# Patient Record
Sex: FEMALE | Race: WHITE
[De-identification: names, ages, dates, MRNs, and addresses within clinical notes are randomized per-mention and may not be internally consistent; named-entity substitution may affect disease eponyms.]

---

## 2018-02-09 ENCOUNTER — HOSPITAL ENCOUNTER (INPATIENT)
Dept: HOSPITAL 17 - NEPE | Age: 83
LOS: 7 days | Discharge: HOSPICE | DRG: 871 | End: 2018-02-16
Attending: HOSPITALIST | Admitting: HOSPITALIST
Payer: MEDICARE

## 2018-02-09 VITALS
OXYGEN SATURATION: 94 % | HEART RATE: 93 BPM | RESPIRATION RATE: 20 BRPM | SYSTOLIC BLOOD PRESSURE: 135 MMHG | DIASTOLIC BLOOD PRESSURE: 62 MMHG

## 2018-02-09 VITALS
SYSTOLIC BLOOD PRESSURE: 135 MMHG | OXYGEN SATURATION: 93 % | RESPIRATION RATE: 20 BRPM | DIASTOLIC BLOOD PRESSURE: 62 MMHG | HEART RATE: 92 BPM

## 2018-02-09 VITALS — BODY MASS INDEX: 24.06 KG/M2 | HEIGHT: 62 IN | WEIGHT: 130.73 LBS

## 2018-02-09 VITALS
SYSTOLIC BLOOD PRESSURE: 120 MMHG | HEART RATE: 79 BPM | DIASTOLIC BLOOD PRESSURE: 59 MMHG | OXYGEN SATURATION: 92 % | RESPIRATION RATE: 18 BRPM

## 2018-02-09 DIAGNOSIS — Z79.4: ICD-10-CM

## 2018-02-09 DIAGNOSIS — J96.01: ICD-10-CM

## 2018-02-09 DIAGNOSIS — J44.0: ICD-10-CM

## 2018-02-09 DIAGNOSIS — J96.02: ICD-10-CM

## 2018-02-09 DIAGNOSIS — I21.4: ICD-10-CM

## 2018-02-09 DIAGNOSIS — Z66: ICD-10-CM

## 2018-02-09 DIAGNOSIS — I10: ICD-10-CM

## 2018-02-09 DIAGNOSIS — F17.210: ICD-10-CM

## 2018-02-09 DIAGNOSIS — E87.70: ICD-10-CM

## 2018-02-09 DIAGNOSIS — F03.90: ICD-10-CM

## 2018-02-09 DIAGNOSIS — A41.9: Primary | ICD-10-CM

## 2018-02-09 DIAGNOSIS — Z90.710: ICD-10-CM

## 2018-02-09 DIAGNOSIS — E11.9: ICD-10-CM

## 2018-02-09 DIAGNOSIS — E87.2: ICD-10-CM

## 2018-02-09 DIAGNOSIS — G93.40: ICD-10-CM

## 2018-02-09 DIAGNOSIS — J18.1: ICD-10-CM

## 2018-02-09 LAB
ALBUMIN SERPL-MCNC: 2.2 GM/DL (ref 3.4–5)
ALP SERPL-CCNC: 111 U/L (ref 45–117)
ALT SERPL-CCNC: 11 U/L (ref 10–53)
AST SERPL-CCNC: 9 U/L (ref 15–37)
BASOPHILS # BLD AUTO: 0 TH/MM3 (ref 0–0.2)
BASOPHILS NFR BLD: 0.2 % (ref 0–2)
BILIRUB SERPL-MCNC: 0.7 MG/DL (ref 0.2–1)
BUN SERPL-MCNC: 12 MG/DL (ref 7–18)
CALCIUM SERPL-MCNC: 8.3 MG/DL (ref 8.5–10.1)
CHLORIDE SERPL-SCNC: 91 MEQ/L (ref 98–107)
CREAT SERPL-MCNC: 0.81 MG/DL (ref 0.5–1)
EOSINOPHIL # BLD: 0 TH/MM3 (ref 0–0.4)
EOSINOPHIL NFR BLD: 0 % (ref 0–4)
ERYTHROCYTE [DISTWIDTH] IN BLOOD BY AUTOMATED COUNT: 14.2 % (ref 11.6–17.2)
GFR SERPLBLD BASED ON 1.73 SQ M-ARVRAT: 68 ML/MIN (ref 89–?)
GLUCOSE SERPL-MCNC: 116 MG/DL (ref 74–106)
HCO3 BLD-SCNC: 27.2 MEQ/L (ref 21–32)
HCT VFR BLD CALC: 38.3 % (ref 35–46)
HGB BLD-MCNC: 13 GM/DL (ref 11.6–15.3)
INR PPP: 1.2 RATIO
LYMPHOCYTES # BLD AUTO: 2.2 TH/MM3 (ref 1–4.8)
LYMPHOCYTES NFR BLD AUTO: 9.2 % (ref 9–44)
MCH RBC QN AUTO: 31.2 PG (ref 27–34)
MCHC RBC AUTO-ENTMCNC: 33.9 % (ref 32–36)
MCV RBC AUTO: 92.1 FL (ref 80–100)
MONOCYTE #: 1.6 TH/MM3 (ref 0–0.9)
MONOCYTES NFR BLD: 6.9 % (ref 0–8)
NEUTROPHILS # BLD AUTO: 19.8 TH/MM3 (ref 1.8–7.7)
NEUTROPHILS NFR BLD AUTO: 83.7 % (ref 16–70)
PLATELET # BLD: 528 TH/MM3 (ref 150–450)
PMV BLD AUTO: 7.7 FL (ref 7–11)
PROT SERPL-MCNC: 6.8 GM/DL (ref 6.4–8.2)
PROTHROMBIN TIME: 11.8 SEC (ref 9.8–11.6)
RBC # BLD AUTO: 4.16 MIL/MM3 (ref 4–5.3)
SODIUM SERPL-SCNC: 129 MEQ/L (ref 136–145)
TROPONIN I SERPL-MCNC: 0.25 NG/ML (ref 0.02–0.05)
WBC # BLD AUTO: 23.6 TH/MM3 (ref 4–11)

## 2018-02-09 PROCEDURE — 94003 VENT MGMT INPAT SUBQ DAY: CPT

## 2018-02-09 PROCEDURE — 36600 WITHDRAWAL OF ARTERIAL BLOOD: CPT

## 2018-02-09 PROCEDURE — 80202 ASSAY OF VANCOMYCIN: CPT

## 2018-02-09 PROCEDURE — 83690 ASSAY OF LIPASE: CPT

## 2018-02-09 PROCEDURE — 82948 REAGENT STRIP/BLOOD GLUCOSE: CPT

## 2018-02-09 PROCEDURE — 80048 BASIC METABOLIC PNL TOTAL CA: CPT

## 2018-02-09 PROCEDURE — 82565 ASSAY OF CREATININE: CPT

## 2018-02-09 PROCEDURE — 82805 BLOOD GASES W/O2 SATURATION: CPT

## 2018-02-09 PROCEDURE — 85610 PROTHROMBIN TIME: CPT

## 2018-02-09 PROCEDURE — 84484 ASSAY OF TROPONIN QUANT: CPT

## 2018-02-09 PROCEDURE — 93005 ELECTROCARDIOGRAM TRACING: CPT

## 2018-02-09 PROCEDURE — 87804 INFLUENZA ASSAY W/OPTIC: CPT

## 2018-02-09 PROCEDURE — 71045 X-RAY EXAM CHEST 1 VIEW: CPT

## 2018-02-09 PROCEDURE — 96368 THER/DIAG CONCURRENT INF: CPT

## 2018-02-09 PROCEDURE — 85025 COMPLETE CBC W/AUTO DIFF WBC: CPT

## 2018-02-09 PROCEDURE — 85730 THROMBOPLASTIN TIME PARTIAL: CPT

## 2018-02-09 PROCEDURE — 94640 AIRWAY INHALATION TREATMENT: CPT

## 2018-02-09 PROCEDURE — 94664 DEMO&/EVAL PT USE INHALER: CPT

## 2018-02-09 PROCEDURE — 82272 OCCULT BLD FECES 1-3 TESTS: CPT

## 2018-02-09 PROCEDURE — 87070 CULTURE OTHR SPECIMN AEROBIC: CPT

## 2018-02-09 PROCEDURE — 93306 TTE W/DOPPLER COMPLETE: CPT

## 2018-02-09 PROCEDURE — 83605 ASSAY OF LACTIC ACID: CPT

## 2018-02-09 PROCEDURE — 71046 X-RAY EXAM CHEST 2 VIEWS: CPT

## 2018-02-09 PROCEDURE — 94002 VENT MGMT INPAT INIT DAY: CPT

## 2018-02-09 PROCEDURE — 82550 ASSAY OF CK (CPK): CPT

## 2018-02-09 PROCEDURE — 87205 SMEAR GRAM STAIN: CPT

## 2018-02-09 PROCEDURE — 87040 BLOOD CULTURE FOR BACTERIA: CPT

## 2018-02-09 PROCEDURE — 80053 COMPREHEN METABOLIC PANEL: CPT

## 2018-02-09 PROCEDURE — 96365 THER/PROPH/DIAG IV INF INIT: CPT

## 2018-02-09 PROCEDURE — 85027 COMPLETE CBC AUTOMATED: CPT

## 2018-02-09 RX ADMIN — PHENYTOIN SODIUM SCH MLS/HR: 50 INJECTION INTRAMUSCULAR; INTRAVENOUS at 01:00

## 2018-02-09 RX ADMIN — HEPARIN SODIUM PRN MLS/HR: 10000 INJECTION, SOLUTION INTRAVENOUS at 22:23

## 2018-02-09 NOTE — PD
HPI


Chief Complaint:  Fever


Time Seen by Provider:  19:19


Travel History


International Travel<30 days:  No


Contact w/Intl Traveler<30days:  No


Traveled to known affect area:  No





History of Present Illness


HPI


Patient is an 83-year-old female coming from a nursing home WITH  PMH of HTN, 

DM and Dementia .. .   


 she's been having weakness tiredness fever and cough for the last few days.  

Patient is a good historian reports feeling not well for the last 5 days.  She 

says she has a sore throat feels her throat is very dry she's had a mild cough.





PFSH


Past Medical History


Cardiovascular Problems:  Yes


Diabetes:  Yes


Respiratory:  Yes





Social History


Tobacco Use:  No


Substance Use:  No





Allergies-Medications


(Allergen,Severity, Reaction):  


Coded Allergies:  


     No Known Allergies (Verified  Allergy, Unknown, 2/9/18)


Reported Meds & Prescriptions





Reported Meds & Active Scripts


Active


Reported


Tramadol (Tramadol HCl) 50 Mg Tab 50 Mg PO Q4H PRN


Cough Syrup (Guaifenesin) 100 Mg/5 Ml Liquid   


Nitroglycerin SL (Nitroglycerin) 0.4 Mg Subl 0.4 Mg SL AS DIRECTED PRN


     ONE TABLET UNDER THE TONGUE AS NEEDED FOR CHEST PAIN, MAY REPEAT EVERY


     FIVE MINUTES FOR A TOTAL OF 3 DOSES OR CALL 911 IF NO RELIEF


Tylenol (Acetaminophen) 325 Mg Tab 325 Mg PO Q4H PRN


Gabapentin 300 Mg Cap 300 Mg PO BID


Lantus Inj (Insulin Glargine) 1,000 Unit/10 Ml Vial 26 Units SQ HS


Lovastatin 10 Mg Tab 10 Mg PO DAILY


Plavix (Clopidogrel Bisulfate) 75 Mg Tab 75 Mg PO DAILY


Sertraline (Sertraline HCl) 50 Mg Tab 50 Mg PO DAILY


Furosemide 20 Mg Tab 20 Mg PO DAILY


Potassium Chloride ER (Potassium Chloride) 10 Meq Tab 10 Meq PO DAILY


Omeprazole 20 Mg Tab 20 Mg PO DAILY








Review of Systems


Except as stated in HPI:  all other systems reviewed are Neg


General / Constitutional:  Positive: Fever


Musculoskeletal:  Positive: Weakness (GENERALIZED WEAKNESS FEVER LISTLESS)





Physical Exam


Narrative


GENERAL: AWAKE ALERT APPEARS TO BE A GOOD HISTORIAN


SKIN: Warm and dry.


HEAD: Atraumatic. Normocephalic. 


EYES: Pupils equal and round. No scleral icterus. No injection or drainage. 


ENT: No nasal bleeding or discharge.  Mucous membranes pink and moist.


NECK: Trachea midline. No JVD. 


CARDIOVASCULAR: Regular rate and rhythm.  


RESPIRATORY: No accessory muscle use. DECREASED BREATH SOUND IN  RIGHT LOWER 

LUNG AND BASIALR CRACKLES  


GASTROINTESTINAL: Abdomen soft, non-tender, nondistended. Hepatic and splenic 

margins not palpable. 


MUSCULOSKELETAL: Extremities without clubbing, cyanosis, or edema. No obvious 

deformities. 


NEUROLOGICAL: Awake and alert. No obvious cranial nerve deficits.  Motor 

grossly within normal limits. Five out of 5 muscle strength in the arms and 

legs.  Normal speech.


PSYCHIATRIC: Appropriate mood and affect; insight and judgment normal.





Data


Data


Last Documented VS





Vital Signs








  Date Time  Temp Pulse Resp B/P (MAP) Pulse Ox O2 Delivery O2 Flow Rate FiO2


 


2/9/18 20:12     93 Nasal Cannula 3.00 


 


2/9/18 20:12  92 20 135/62 (86)    








Orders





 Orders


Complete Blood Count With Diff (2/9/18 19:19)


Comprehensive Metabolic Panel (2/9/18 19:19)


Ckmb (Isoenzyme) Profile (2/9/18 19:19)


Troponin I (2/9/18 19:19)


Lipase (2/9/18 19:19)


Chest, Pa & Lat (2/9/18 19:19)


Vancomycin Inj (Vancomycin Inj) (2/9/18 20:15)


Piperacil-Tazo 3.375 Gm Premix (Zosyn 3. (2/9/18 20:15)


Albuterol-Ipratropium Neb (Duoneb Neb) (2/9/18 20:15)


Sodium Chlor 0.9% 1000 Ml Inj (Ns 1000 M (2/9/18 20:15)


Influenzae A/B Antigen (2/9/18 20:40)


Aspirin Chew (Aspirin Chew) (2/9/18 21:45)


Nitroglycerin 2% Oint (Nitroglycerin 2% (2/9/18 21:45)


Heparin Inj (Heparin Inj) (2/9/18 21:45)


Heparin Inj (Heparin Inj) (2/10/18 03:45)


Heparin Inj (Heparin Inj) (2/10/18 03:45)


Heparin-D5w 25,000 U/250 Ml (Heparin-D5w (2/9/18 21:45)


Act Partial Throm Time (Ptt) (2/9/18 21:39)


Prothrombin Time / Inr (Pt) (2/9/18 21:39)


Act Partial Throm Time (Ptt) (2/10/18 04:39)


Occult Blood (Hemoccult) Stool (2/9/18 21:39)


Admit Order (Ed Use Only) (2/9/18 )





Labs





Laboratory Tests








Test


  2/9/18


19:30 2/9/18


21:30


 


White Blood Count 23.6 TH/MM3  


 


Red Blood Count 4.16 MIL/MM3  


 


Hemoglobin 13.0 GM/DL  


 


Hematocrit 38.3 %  


 


Mean Corpuscular Volume 92.1 FL  


 


Mean Corpuscular Hemoglobin 31.2 PG  


 


Mean Corpuscular Hemoglobin


Concent 33.9 % 


  


 


 


Red Cell Distribution Width 14.2 %  


 


Platelet Count 528 TH/MM3  


 


Mean Platelet Volume 7.7 FL  


 


Neutrophils (%) (Auto) 83.7 %  


 


Lymphocytes (%) (Auto) 9.2 %  


 


Monocytes (%) (Auto) 6.9 %  


 


Eosinophils (%) (Auto) 0.0 %  


 


Basophils (%) (Auto) 0.2 %  


 


Neutrophils # (Auto) 19.8 TH/MM3  


 


Lymphocytes # (Auto) 2.2 TH/MM3  


 


Monocytes # (Auto) 1.6 TH/MM3  


 


Eosinophils # (Auto) 0.0 TH/MM3  


 


Basophils # (Auto) 0.0 TH/MM3  


 


CBC Comment DIFF FINAL  


 


Differential Comment   


 


Blood Urea Nitrogen 12 MG/DL  


 


Creatinine 0.81 MG/DL  


 


Random Glucose 116 MG/DL  


 


Total Protein 6.8 GM/DL  


 


Albumin 2.2 GM/DL  


 


Calcium Level 8.3 MG/DL  


 


Alkaline Phosphatase 111 U/L  


 


Aspartate Amino Transf


(AST/SGOT) 9 U/L 


  


 


 


Alanine Aminotransferase


(ALT/SGPT) 11 U/L 


  


 


 


Total Bilirubin 0.7 MG/DL  


 


Sodium Level 129 MEQ/L  


 


Potassium Level 3.5 MEQ/L  


 


Chloride Level 91 MEQ/L  


 


Carbon Dioxide Level 27.2 MEQ/L  


 


Anion Gap 11 MEQ/L  


 


Estimat Glomerular Filtration


Rate 68 ML/MIN 


  


 


 


Total Creatine Kinase 40 U/L  


 


Troponin I 0.25 NG/ML  


 


Lipase 64 U/L  


 


Prothrombin Time  11.8 SEC 


 


Prothromb Time International


Ratio 


  1.2 RATIO 


 


 


Activated Partial


Thromboplast Time 


  33.2 SEC 


 











MDM


Medical Decision Making


Medical Screen Exam Complete:  Yes


Emergency Medical Condition:  Yes


Differential Diagnosis


FEVER  FUO, SEPSIS OF  VIRAL ILLNESS VS  SEPSIS FROM  PNA  OR  UTI ,  OTHER


Narrative Course


FLUID RESUSITATION INITIATED  TYLENOL AND  CXRAY   LARGE  RIGHT  RML AND RLL 

PNA   VANCO  ZOSYN,   THEN  TROP RETURNS  ELEVATED AND  SHE IS GIVEN   ASA AND  

NITRO AND  ADMITTED TO  MEDICAL SERVICE TELE  , FOR SEPSIS  MI NON STEMI AND PNA





Diagnosis





 Primary Impression:  


 NSTEMI (non-ST elevated myocardial infarction)


 Additional Impression:  


 PNA (pneumonia)


 Qualified Codes:  J18.1 - Lobar pneumonia, unspecified organism











Lennox Steele MD Feb 9, 2018 19:52

## 2018-02-09 NOTE — HHI.HP
__________________________________________________





HPI


Service


[facility]


Primary Care Physician


Poli Baer MD (Paul)


Admission Diagnosis





CP   NSTEMI


Diagnoses:  


(1) Sepsis


Diagnosis:  Principal





(2) PNA (pneumonia)


Diagnosis:  Principal





(3) NSTEMI (non-ST elevated myocardial infarction)


Diagnosis:  Principal





(4) DM (diabetes mellitus)


Diagnosis:  Principal





Travel History


International Travel<30 Days:  No


Contact w/Intl Traveler <30 Da:  No


Traveled to Known Affected Are:  No


History of Present Illness


This is an 83-year-old female with a PMH of HTN, DM and Dementia who was sent 

to the ER from SNF secondary to generalized weakness, fever and confusion.  Per 

patient, she's had progressive weakness/fatigue for 4-5 days w/ associated non-

productive cough.  Unsure if she's had fever, however SNF records indicate 

fever of 102.0.  No c/o chest pain or SOB.  On arrival, /62, HR 93, O2 

sat 94% on RA.  WBC 23.6.  Na 129.  468.  Troponin 0.25.  INR 1.2.  CXR patchy 

diffuse infiltrate of right mid to right lower lung suggestive of pneumonia.  S/

p Vanc/Zosyn and started on Heparin gtt in ER.





Review of Systems


Except as stated in HPI:  all other systems reviewed are Neg


ROS: 14 point review of systems otherwise negative.





Past Family Social History


Past Medical History


PMH:  HTN, DM and Dementia


Past Surgical History


PAST SURGICAL HISTORY:  Hysterectomy


Allergies:  


Coded Allergies:  


     No Known Allergies (Verified  Allergy, Unknown, 18)


Family History


PAST FAMILY HISTORY:  Reviewed.  No h/o DM or CAD


Social History


PAST SOCIAL HISTORY:  Negative for alcohol, tobacco or drugs.





Physical Exam


Vital Signs





Vital Signs








  Date Time  Temp Pulse Resp B/P (MAP) Pulse Ox O2 Delivery O2 Flow Rate FiO2


 


18 20:12     93 Nasal Cannula 3.00 


 


18 20:12  92 20 135/62 (86) 93 Nasal Cannula  


 


18 19:12  93 20 135/62 (86) 94   








Physical Exam


PE:


GENERAL: Elderly white female in no acute distress.


HEENT: PERRLA, EOMI. No scleral icterus or conjunctival pallor. No lid lag or 

facial droop.  


CARDIOVASCULAR: Regular rate and rhythm.  No obvious murmurs to auscultation. 

No chest tenderness to palpation. 


RESPIRATORY: No obvious rhonchi or wheezing. Clear to auscultation. Breath 

sounds equal bilaterally. 


GASTROINTESTINAL: Abdomen soft, non-tender, nondistended. BS normal. 


MUSCULOSKELETAL: Extremities without clubbing, cyanosis, or edema. No obvious 

deformities. 


NEUROLOGICAL: Awake, alert and oriented x4. No focal neurologic deficits. 

Moving both upper and lower extremities spontaneously.


Laboratory





Laboratory Tests








Test


  18


19:30 18


21:30


 


White Blood Count 23.6  


 


Red Blood Count 4.16  


 


Hemoglobin 13.0  


 


Hematocrit 38.3  


 


Mean Corpuscular Volume 92.1  


 


Mean Corpuscular Hemoglobin 31.2  


 


Mean Corpuscular Hemoglobin


Concent 33.9 


  


 


 


Red Cell Distribution Width 14.2  


 


Platelet Count 528  


 


Mean Platelet Volume 7.7  


 


Neutrophils (%) (Auto) 83.7  


 


Lymphocytes (%) (Auto) 9.2  


 


Monocytes (%) (Auto) 6.9  


 


Eosinophils (%) (Auto) 0.0  


 


Basophils (%) (Auto) 0.2  


 


Neutrophils # (Auto) 19.8  


 


Lymphocytes # (Auto) 2.2  


 


Monocytes # (Auto) 1.6  


 


Eosinophils # (Auto) 0.0  


 


Basophils # (Auto) 0.0  


 


CBC Comment DIFF FINAL  


 


Differential Comment   


 


Blood Urea Nitrogen 12  


 


Creatinine 0.81  


 


Random Glucose 116  


 


Total Protein 6.8  


 


Albumin 2.2  


 


Calcium Level 8.3  


 


Alkaline Phosphatase 111  


 


Aspartate Amino Transf


(AST/SGOT) 9 


  


 


 


Alanine Aminotransferase


(ALT/SGPT) 11 


  


 


 


Total Bilirubin 0.7  


 


Sodium Level 129  


 


Potassium Level 3.5  


 


Chloride Level 91  


 


Carbon Dioxide Level 27.2  


 


Anion Gap 11  


 


Estimat Glomerular Filtration


Rate 68 


  


 


 


Total Creatine Kinase 40  


 


Troponin I 0.25  


 


Lipase 64  














 Date/Time


Source Procedure


Growth Status


 


 


 18 21:25


Nasal Aspirate Influenza Types A,B Antigen (ZEINAB) - Final


NEGATIVE FOR FLU A AND B ANTIGEN.... Complete








Result Diagram:  


18








Caprini VTE Risk Assessment


Caprini VTE Risk Assessment:  Mod/High Risk (score >= 2)


Caprini Risk Assessment Model











 Point Value = 1          Point Value = 2  Point Value = 3  Point Value = 5


 


Age 41-60


Minor surgery


BMI > 25 kg/m2


Swollen legs


Varicose veins


Pregnancy or postpartum


History of unexplained or recurrent


   spontaneous 


Oral contraceptives or hormone


   replacement


Sepsis (< 1 month)


Serious lung disease, including


   pneumonia (< 1 month)


Abnormal pulmonary function


Acute myocardial infarction


Congestive heart failure (< 1 month)


History of inflammatory bowel disease


Medical patient at bed rest Age 61-74


Arthroscopic surgery


Major open surgery (> 45 min)


Laparoscopic surgery (> 45 min)


Malignancy


Confined to bed (> 72 hours)


Immobilizing plaster cast


Central venous access Age >= 75


History of VTE


Family history of VTE


Factor V Leiden


Prothrombin 16969E


Lupus anticoagulant


Anticardiolipin antibodies


Elevated serum homocysteine


Heparin-induced thrombocytopenia


Other congenital or acquired


   thrombophilia Stroke (< 1 month)


Elective arthroplasty


Hip, pelvis, or leg fracture


Acute spinal cord injury (< 1 month)








Prophylaxis Regimen











   Total Risk


Factor Score Risk Level Prophylaxis Regimen


 


0-1      Low Early ambulation


 


2 Moderate Order ONE of the following:


*Sequential Compression Device (SCD)


*Heparin 5000 units SQ BID


 


3-4 Higher Order ONE of the following medications:


*Heparin 5000 units SQ TID


*Enoxaparin/Lovenox 40 mg SQ daily (WT < 150 kg, CrCl > 30 mL/min)


*Enoxaparin/Lovenox 30 mg SQ daily (WT < 150 kg, CrCl > 10-29 mL/min)


*Enoxaparin/Lovenox 30 mg SQ BID (WT < 150 kg, CrCl > 30 mL/min)


AND/OR


*Sequential Compression Device (SCD)


 


5 or more Highest Order ONE of the following medications:


*Heparin 5000 units SQ TID (Preferred with Epidurals)


*Enoxaparin/Lovenox 40 mg SQ daily (WT < 150 kg, CrCl > 30 mL/min)


*Enoxaparin/Lovenox 30 mg SQ daily (WT < 150 kg, CrCl > 10-29 mL/min)


*Enoxaparin/Lovenox 30 mg SQ BID (WT < 150 kg, CrCl > 30 mL/min)


AND


*Sequential Compression Device (SCD)











Assessment and Plan


Problem List:  


(1) Sepsis


ICD Code:  A41.9 - Sepsis, unspecified organism


(2) PNA (pneumonia)


ICD Code:  J18.9 - Pneumonia, unspecified organism


(3) NSTEMI (non-ST elevated myocardial infarction)


ICD Code:  I21.4 - Non-ST elevation (NSTEMI) myocardial infarction


(4) DM (diabetes mellitus)


ICD Code:  E11.9 - Type 2 diabetes mellitus without complications


Assessment and Plan


A/P:


1.  Sepsis:  HR 90, WBC 26, Source-PNA.  Check Blood Cultures, check Lactic 

Acid.  S/p Vanc/Zosyn in ER, continue w/ IV Abx.  IVF for hydration. 


2.  PNA:  c/o cough/fever.  WBC 26 as above, CXR w/ RLL/RML PNA.  Check Sputum 

Cultures, continue w/ IV Abx as above.  DuoNeb prn if needed. 


3.  NSTEMI:  Trop 0.25, no c/o chest pain at this time.  Started on Heparin gtt

, continue Heparin, check serial cardiac enzymes for trend.  Consult Cardiology 

for further evaluation/recommendations. 


4.  DM:  Sliding scale w/ Accu-Cheks.  Resume home Insulin.


5.  DVT Prophylaxis:  Heparin gtt


6.  Social work for d/c planning as needed. 


7.  Case discussed w/ ER physician at length, labs/records/imaging reviewed by 

me.





Physician Certification


2 Midnight Certification Type:  Admission for Inpatient Services


Order for Inpatient Services


The services are ordered in accordance with Medicare regulations or non-

Medicare payer requirements, as applicable.  In the case of services not 

specified as inpatient-only, they are appropriately provided as inpatient 

services in accordance with the 2-midnight benchmark.


Estimated LOS (days):  2


 days is the estimated time the patient will need to remain in the hospital, 

assuming treatment plan goals are met and no additional complications.


Post-Hospital Plan:  Not yet determined











Brianna Tong MD 2018 22:01

## 2018-02-10 VITALS
DIASTOLIC BLOOD PRESSURE: 54 MMHG | OXYGEN SATURATION: 97 % | RESPIRATION RATE: 22 BRPM | HEART RATE: 80 BPM | SYSTOLIC BLOOD PRESSURE: 123 MMHG | TEMPERATURE: 98.2 F

## 2018-02-10 VITALS
OXYGEN SATURATION: 96 % | SYSTOLIC BLOOD PRESSURE: 127 MMHG | HEART RATE: 80 BPM | TEMPERATURE: 98.3 F | RESPIRATION RATE: 20 BRPM | DIASTOLIC BLOOD PRESSURE: 52 MMHG

## 2018-02-10 VITALS — HEART RATE: 76 BPM

## 2018-02-10 VITALS
TEMPERATURE: 98.6 F | OXYGEN SATURATION: 92 % | DIASTOLIC BLOOD PRESSURE: 62 MMHG | SYSTOLIC BLOOD PRESSURE: 151 MMHG | RESPIRATION RATE: 24 BRPM | HEART RATE: 113 BPM

## 2018-02-10 VITALS — OXYGEN SATURATION: 2 %

## 2018-02-10 VITALS — HEART RATE: 118 BPM

## 2018-02-10 VITALS
HEART RATE: 80 BPM | SYSTOLIC BLOOD PRESSURE: 113 MMHG | DIASTOLIC BLOOD PRESSURE: 47 MMHG | TEMPERATURE: 98.1 F | RESPIRATION RATE: 18 BRPM | OXYGEN SATURATION: 99 %

## 2018-02-10 VITALS
DIASTOLIC BLOOD PRESSURE: 55 MMHG | SYSTOLIC BLOOD PRESSURE: 127 MMHG | TEMPERATURE: 99.1 F | OXYGEN SATURATION: 95 % | RESPIRATION RATE: 20 BRPM | HEART RATE: 84 BPM

## 2018-02-10 VITALS — HEART RATE: 84 BPM

## 2018-02-10 VITALS
OXYGEN SATURATION: 92 % | HEART RATE: 84 BPM | SYSTOLIC BLOOD PRESSURE: 122 MMHG | TEMPERATURE: 98.7 F | RESPIRATION RATE: 18 BRPM | DIASTOLIC BLOOD PRESSURE: 56 MMHG

## 2018-02-10 VITALS — HEART RATE: 81 BPM

## 2018-02-10 VITALS — HEART RATE: 77 BPM

## 2018-02-10 VITALS — HEART RATE: 80 BPM

## 2018-02-10 VITALS — HEART RATE: 85 BPM

## 2018-02-10 VITALS — HEART RATE: 83 BPM

## 2018-02-10 VITALS — HEART RATE: 96 BPM

## 2018-02-10 VITALS — HEART RATE: 99 BPM

## 2018-02-10 VITALS — HEART RATE: 75 BPM

## 2018-02-10 LAB
ALBUMIN SERPL-MCNC: 1.9 GM/DL (ref 3.4–5)
ALP SERPL-CCNC: 91 U/L (ref 45–117)
ALT SERPL-CCNC: 9 U/L (ref 10–53)
AST SERPL-CCNC: 10 U/L (ref 15–37)
BASOPHILS # BLD AUTO: 0 TH/MM3 (ref 0–0.2)
BASOPHILS NFR BLD: 0.1 % (ref 0–2)
BILIRUB SERPL-MCNC: 0.8 MG/DL (ref 0.2–1)
BUN SERPL-MCNC: 12 MG/DL (ref 7–18)
CALCIUM SERPL-MCNC: 7.9 MG/DL (ref 8.5–10.1)
CHLORIDE SERPL-SCNC: 97 MEQ/L (ref 98–107)
CREAT SERPL-MCNC: 0.7 MG/DL (ref 0.5–1)
EOSINOPHIL # BLD: 0 TH/MM3 (ref 0–0.4)
EOSINOPHIL NFR BLD: 0.1 % (ref 0–4)
ERYTHROCYTE [DISTWIDTH] IN BLOOD BY AUTOMATED COUNT: 13.9 % (ref 11.6–17.2)
GFR SERPLBLD BASED ON 1.73 SQ M-ARVRAT: 80 ML/MIN (ref 89–?)
GLUCOSE SERPL-MCNC: 107 MG/DL (ref 74–106)
HCO3 BLD-SCNC: 26.9 MEQ/L (ref 21–32)
HCT VFR BLD CALC: 35.6 % (ref 35–46)
HGB BLD-MCNC: 12.1 GM/DL (ref 11.6–15.3)
LYMPHOCYTES # BLD AUTO: 2.1 TH/MM3 (ref 1–4.8)
LYMPHOCYTES NFR BLD AUTO: 9.8 % (ref 9–44)
MCH RBC QN AUTO: 31.4 PG (ref 27–34)
MCHC RBC AUTO-ENTMCNC: 34 % (ref 32–36)
MCV RBC AUTO: 92.4 FL (ref 80–100)
MONOCYTE #: 1.8 TH/MM3 (ref 0–0.9)
MONOCYTES NFR BLD: 8.6 % (ref 0–8)
NEUTROPHILS # BLD AUTO: 17.1 TH/MM3 (ref 1.8–7.7)
NEUTROPHILS NFR BLD AUTO: 81.4 % (ref 16–70)
PLATELET # BLD: 459 TH/MM3 (ref 150–450)
PMV BLD AUTO: 7.1 FL (ref 7–11)
PROT SERPL-MCNC: 6.4 GM/DL (ref 6.4–8.2)
RBC # BLD AUTO: 3.86 MIL/MM3 (ref 4–5.3)
SODIUM SERPL-SCNC: 132 MEQ/L (ref 136–145)
TROPONIN I SERPL-MCNC: 0.19 NG/ML (ref 0.02–0.05)
WBC # BLD AUTO: 21.1 TH/MM3 (ref 4–11)

## 2018-02-10 RX ADMIN — CEFEPIME SCH MLS/HR: 1 INJECTION, POWDER, FOR SOLUTION INTRAMUSCULAR; INTRAVENOUS at 09:24

## 2018-02-10 RX ADMIN — ONDANSETRON PRN MG: 2 INJECTION, SOLUTION INTRAMUSCULAR; INTRAVENOUS at 10:35

## 2018-02-10 RX ADMIN — CEFEPIME SCH MLS/HR: 1 INJECTION, POWDER, FOR SOLUTION INTRAMUSCULAR; INTRAVENOUS at 19:52

## 2018-02-10 RX ADMIN — INSULIN ASPART SCH: 100 INJECTION, SOLUTION INTRAVENOUS; SUBCUTANEOUS at 08:31

## 2018-02-10 RX ADMIN — SERTRALINE HYDROCHLORIDE SCH MG: 50 TABLET, FILM COATED ORAL at 09:22

## 2018-02-10 RX ADMIN — GUAIFENESIN PRN MG: 100 SOLUTION ORAL at 11:15

## 2018-02-10 RX ADMIN — Medication SCH ML: at 19:52

## 2018-02-10 RX ADMIN — VANCOMYCIN HYDROCHLORIDE SCH MLS/HR: 1 INJECTION, SOLUTION INTRAVENOUS at 19:51

## 2018-02-10 RX ADMIN — GABAPENTIN SCH MG: 300 CAPSULE ORAL at 09:22

## 2018-02-10 RX ADMIN — ALBUTEROL SULFATE PRN MG: 2.5 SOLUTION RESPIRATORY (INHALATION) at 11:32

## 2018-02-10 RX ADMIN — ACYCLOVIR SCH UNITS: 800 TABLET ORAL at 19:53

## 2018-02-10 RX ADMIN — GABAPENTIN SCH MG: 300 CAPSULE ORAL at 19:53

## 2018-02-10 RX ADMIN — PHENYTOIN SODIUM SCH MLS/HR: 50 INJECTION INTRAMUSCULAR; INTRAVENOUS at 17:12

## 2018-02-10 RX ADMIN — GUAIFENESIN PRN MG: 100 SOLUTION ORAL at 19:52

## 2018-02-10 RX ADMIN — GUAIFENESIN SCH MG: 600 TABLET, EXTENDED RELEASE ORAL at 19:53

## 2018-02-10 RX ADMIN — STANDARDIZED SENNA CONCENTRATE AND DOCUSATE SODIUM SCH TAB: 8.6; 5 TABLET, FILM COATED ORAL at 19:53

## 2018-02-10 RX ADMIN — INSULIN ASPART SCH: 100 INJECTION, SOLUTION INTRAVENOUS; SUBCUTANEOUS at 21:00

## 2018-02-10 RX ADMIN — INSULIN ASPART SCH: 100 INJECTION, SOLUTION INTRAVENOUS; SUBCUTANEOUS at 17:11

## 2018-02-10 RX ADMIN — STANDARDIZED SENNA CONCENTRATE AND DOCUSATE SODIUM SCH TAB: 8.6; 5 TABLET, FILM COATED ORAL at 09:22

## 2018-02-10 RX ADMIN — Medication SCH ML: at 09:24

## 2018-02-10 RX ADMIN — PHENYTOIN SODIUM SCH MLS/HR: 50 INJECTION INTRAMUSCULAR; INTRAVENOUS at 07:57

## 2018-02-10 RX ADMIN — INSULIN ASPART SCH: 100 INJECTION, SOLUTION INTRAVENOUS; SUBCUTANEOUS at 11:18

## 2018-02-10 RX ADMIN — PANTOPRAZOLE SODIUM SCH MG: 20 TABLET, DELAYED RELEASE ORAL at 09:22

## 2018-02-10 NOTE — MB
cc:

GIOVANI SILVA M.D.

****

 

 

DATE OF CONSULTATION:  02/10/2018.

 

REASON FOR CONSULTATION:

Sepsis.  Chest pain.

 

HISTORY OF PRESENT ILLNESS:

Mrs. Berkowitz is an 83-year-old  female with history of high blood

pressure, diabetes mellitus, dementia who has been living in an assisted living

facility. She was brought to the emergency room due to nonproductive cough and

fever.  She refers also some chest pain. She is a chronic smoker.  Antibiotic

was initiated.  Troponin increased.  There are x-ray changes. I was consulted

for further evaluation and management.

 

The chart was reviewed. The patient was evaluated.

 

ALLERGIES:

NONE.

 

SOCIAL HISTORY:

She mentioned she is still smoking a pack of cigarettes a day.

 

FAMILY HISTORY:

Noncontributory to her current medical condition.

 

MEDICATIONS:

Currently she is on:

1. Cefepime 1 gram q. 12 hours.

2. Heparin IV.

3. Piperacillin / tazobactam.

4. Vancomycin.

5. Norco.

6. Tylenol.

7. Albuterol.

8. Aspirin 325 milligrams a day.

9. Neurontin 300 milligrams twice a day.

10. Reglan.

11. Zoloft 50 milligrams a day.

 

REVIEW OF SYSTEMS:

Currently she refers no chest pain. No chest discomfort. No vomiting. No fever.

 

 

PHYSICAL EXAMINATION:

GENERAL: She is alert and oriented in person and space.

VITAL SIGNS: Blood pressure is 113/47, pulse 70, respiratory rate 18.

LUNGS: Ventilated.

CARDIOVASCULAR: S1-S2. Regular No gallop.

ABDOMEN: Abdomen soft, no mass. No bruits.

EXTREMITIES: No edema.

 

EKGS:

Electrocardiogram shows sinus rhythm.  No acute S-T and T-wave changes.

 

LABORATORY DATA:

Hemoglobin 12.1, white blood cell 21.1 coming down from 23.6, potassium is 3.4,

creatinine is 0.70.

 

Troponin is 0.9 and trending down from 0.25.

 

INR is 1.2.

 

ASSESSMENT AND RECOMMENDATIONS:

Mrs. Berkowitz is a chronic smoker.  She most likely has uncompensated COPD and

pneumonia.  Chest x-ray shows right lower lobe pneumonia.  She is on IV

antibiotics.

 

There are no acute S-T wave changes on electrocardiogram.

 

She refers no chest pain on activity.

 

At this point, my recommendation is to continue with current management.  When

the patient is stable, then I will consider a nuclear stress study.

 

The case was discussed with the patient.

 

 

                              _________________________________

                              MD SKY Etienne/CIERRA

D:  2/10/2018/1:39 PM

T:  2/10/2018/3:13 PM

Visit #:  [id number]

Job #:  [id number]

## 2018-02-10 NOTE — EKG
Date Performed: 02/09/2018       Time Performed: 21:41:08

 

PTAGE:      83 years

 

EKG:      Sinus rhythm 

 

 NORMAL ECG 

 

NO PREVIOUS TRACING            

 

DOCTOR:   Asher Baez  Interpretating Date/Time  02/10/2018 23:42:23

## 2018-02-11 VITALS — OXYGEN SATURATION: 96 %

## 2018-02-11 VITALS
SYSTOLIC BLOOD PRESSURE: 147 MMHG | TEMPERATURE: 98.6 F | HEART RATE: 124 BPM | OXYGEN SATURATION: 85 % | RESPIRATION RATE: 22 BRPM | DIASTOLIC BLOOD PRESSURE: 56 MMHG

## 2018-02-11 VITALS — HEART RATE: 79 BPM

## 2018-02-11 VITALS
DIASTOLIC BLOOD PRESSURE: 49 MMHG | TEMPERATURE: 98.4 F | RESPIRATION RATE: 20 BRPM | HEART RATE: 77 BPM | SYSTOLIC BLOOD PRESSURE: 105 MMHG | OXYGEN SATURATION: 97 %

## 2018-02-11 VITALS — HEART RATE: 74 BPM

## 2018-02-11 VITALS
SYSTOLIC BLOOD PRESSURE: 137 MMHG | TEMPERATURE: 98.1 F | RESPIRATION RATE: 18 BRPM | DIASTOLIC BLOOD PRESSURE: 59 MMHG | OXYGEN SATURATION: 98 % | HEART RATE: 83 BPM

## 2018-02-11 VITALS
TEMPERATURE: 98.3 F | RESPIRATION RATE: 18 BRPM | OXYGEN SATURATION: 97 % | HEART RATE: 80 BPM | DIASTOLIC BLOOD PRESSURE: 55 MMHG | SYSTOLIC BLOOD PRESSURE: 123 MMHG

## 2018-02-11 VITALS — HEART RATE: 77 BPM

## 2018-02-11 VITALS
HEART RATE: 72 BPM | TEMPERATURE: 98.6 F | OXYGEN SATURATION: 93 % | DIASTOLIC BLOOD PRESSURE: 53 MMHG | SYSTOLIC BLOOD PRESSURE: 120 MMHG | RESPIRATION RATE: 18 BRPM

## 2018-02-11 VITALS
DIASTOLIC BLOOD PRESSURE: 48 MMHG | RESPIRATION RATE: 18 BRPM | SYSTOLIC BLOOD PRESSURE: 120 MMHG | OXYGEN SATURATION: 92 % | HEART RATE: 79 BPM | TEMPERATURE: 98.2 F

## 2018-02-11 VITALS — HEART RATE: 80 BPM

## 2018-02-11 VITALS — HEART RATE: 76 BPM

## 2018-02-11 VITALS — OXYGEN SATURATION: 97 %

## 2018-02-11 LAB
ALBUMIN SERPL-MCNC: 1.7 GM/DL (ref 3.4–5)
ALP SERPL-CCNC: 80 U/L (ref 45–117)
ALT SERPL-CCNC: 9 U/L (ref 10–53)
AST SERPL-CCNC: 13 U/L (ref 15–37)
BASOPHILS # BLD AUTO: 0 TH/MM3 (ref 0–0.2)
BASOPHILS NFR BLD: 0.2 % (ref 0–2)
BILIRUB SERPL-MCNC: 0.6 MG/DL (ref 0.2–1)
BUN SERPL-MCNC: 10 MG/DL (ref 7–18)
CALCIUM SERPL-MCNC: 7.6 MG/DL (ref 8.5–10.1)
CHLORIDE SERPL-SCNC: 101 MEQ/L (ref 98–107)
CREAT SERPL-MCNC: 0.62 MG/DL (ref 0.5–1)
EOSINOPHIL # BLD: 0 TH/MM3 (ref 0–0.4)
EOSINOPHIL NFR BLD: 0 % (ref 0–4)
ERYTHROCYTE [DISTWIDTH] IN BLOOD BY AUTOMATED COUNT: 14 % (ref 11.6–17.2)
GFR SERPLBLD BASED ON 1.73 SQ M-ARVRAT: 92 ML/MIN (ref 89–?)
GLUCOSE SERPL-MCNC: 189 MG/DL (ref 74–106)
HCO3 BLD-SCNC: 26.4 MEQ/L (ref 21–32)
HCT VFR BLD CALC: 34.9 % (ref 35–46)
HGB BLD-MCNC: 11.7 GM/DL (ref 11.6–15.3)
LYMPHOCYTES # BLD AUTO: 1.8 TH/MM3 (ref 1–4.8)
LYMPHOCYTES NFR BLD AUTO: 9.4 % (ref 9–44)
MCH RBC QN AUTO: 31.5 PG (ref 27–34)
MCHC RBC AUTO-ENTMCNC: 33.6 % (ref 32–36)
MCV RBC AUTO: 93.6 FL (ref 80–100)
MONOCYTE #: 1.4 TH/MM3 (ref 0–0.9)
MONOCYTES NFR BLD: 7.4 % (ref 0–8)
NEUTROPHILS # BLD AUTO: 15.9 TH/MM3 (ref 1.8–7.7)
NEUTROPHILS NFR BLD AUTO: 83 % (ref 16–70)
PLATELET # BLD: 474 TH/MM3 (ref 150–450)
PMV BLD AUTO: 7.3 FL (ref 7–11)
PROT SERPL-MCNC: 5.7 GM/DL (ref 6.4–8.2)
RBC # BLD AUTO: 3.73 MIL/MM3 (ref 4–5.3)
SODIUM SERPL-SCNC: 133 MEQ/L (ref 136–145)
WBC # BLD AUTO: 19.1 TH/MM3 (ref 4–11)

## 2018-02-11 PROCEDURE — 5A09357 ASSISTANCE WITH RESPIRATORY VENTILATION, LESS THAN 24 CONSECUTIVE HOURS, CONTINUOUS POSITIVE AIRWAY PRESSURE: ICD-10-PCS | Performed by: HOSPITALIST

## 2018-02-11 RX ADMIN — IPRATROPIUM BROMIDE AND ALBUTEROL SULFATE SCH AMPULE: .5; 3 SOLUTION RESPIRATORY (INHALATION) at 21:39

## 2018-02-11 RX ADMIN — PHENYTOIN SODIUM SCH MLS/HR: 50 INJECTION INTRAMUSCULAR; INTRAVENOUS at 03:57

## 2018-02-11 RX ADMIN — INSULIN ASPART SCH: 100 INJECTION, SOLUTION INTRAVENOUS; SUBCUTANEOUS at 08:00

## 2018-02-11 RX ADMIN — CEFEPIME SCH MLS/HR: 2 INJECTION, POWDER, FOR SOLUTION INTRAVENOUS at 19:57

## 2018-02-11 RX ADMIN — INSULIN ASPART SCH: 100 INJECTION, SOLUTION INTRAVENOUS; SUBCUTANEOUS at 17:16

## 2018-02-11 RX ADMIN — GUAIFENESIN SCH MG: 600 TABLET, EXTENDED RELEASE ORAL at 09:48

## 2018-02-11 RX ADMIN — GUAIFENESIN PRN MG: 100 SOLUTION ORAL at 19:58

## 2018-02-11 RX ADMIN — SERTRALINE HYDROCHLORIDE SCH MG: 50 TABLET, FILM COATED ORAL at 09:49

## 2018-02-11 RX ADMIN — STANDARDIZED SENNA CONCENTRATE AND DOCUSATE SODIUM SCH TAB: 8.6; 5 TABLET, FILM COATED ORAL at 09:49

## 2018-02-11 RX ADMIN — Medication SCH ML: at 19:57

## 2018-02-11 RX ADMIN — PHENYTOIN SODIUM SCH MLS/HR: 50 INJECTION INTRAMUSCULAR; INTRAVENOUS at 13:57

## 2018-02-11 RX ADMIN — IPRATROPIUM BROMIDE AND ALBUTEROL SULFATE SCH AMPULE: .5; 3 SOLUTION RESPIRATORY (INHALATION) at 23:55

## 2018-02-11 RX ADMIN — STANDARDIZED SENNA CONCENTRATE AND DOCUSATE SODIUM SCH TAB: 8.6; 5 TABLET, FILM COATED ORAL at 19:58

## 2018-02-11 RX ADMIN — INSULIN ASPART SCH: 100 INJECTION, SOLUTION INTRAVENOUS; SUBCUTANEOUS at 21:10

## 2018-02-11 RX ADMIN — HEPARIN SODIUM PRN MLS/HR: 10000 INJECTION, SOLUTION INTRAVENOUS at 21:09

## 2018-02-11 RX ADMIN — CEFEPIME SCH MLS/HR: 1 INJECTION, POWDER, FOR SOLUTION INTRAMUSCULAR; INTRAVENOUS at 09:48

## 2018-02-11 RX ADMIN — IPRATROPIUM BROMIDE AND ALBUTEROL SULFATE SCH AMPULE: .5; 3 SOLUTION RESPIRATORY (INHALATION) at 15:51

## 2018-02-11 RX ADMIN — VANCOMYCIN HYDROCHLORIDE SCH MLS/HR: 1 INJECTION, SOLUTION INTRAVENOUS at 19:56

## 2018-02-11 RX ADMIN — ACETYLCYSTEINE SCH ML: 200 SOLUTION ORAL; RESPIRATORY (INHALATION) at 16:00

## 2018-02-11 RX ADMIN — GABAPENTIN SCH MG: 300 CAPSULE ORAL at 19:58

## 2018-02-11 RX ADMIN — Medication SCH ML: at 09:50

## 2018-02-11 RX ADMIN — INSULIN ASPART SCH: 100 INJECTION, SOLUTION INTRAVENOUS; SUBCUTANEOUS at 13:04

## 2018-02-11 RX ADMIN — IPRATROPIUM BROMIDE AND ALBUTEROL SULFATE SCH AMPULE: .5; 3 SOLUTION RESPIRATORY (INHALATION) at 12:00

## 2018-02-11 RX ADMIN — ACYCLOVIR SCH UNITS: 800 TABLET ORAL at 21:00

## 2018-02-11 RX ADMIN — GABAPENTIN SCH MG: 300 CAPSULE ORAL at 09:49

## 2018-02-11 RX ADMIN — PANTOPRAZOLE SODIUM SCH MG: 20 TABLET, DELAYED RELEASE ORAL at 09:48

## 2018-02-11 RX ADMIN — METHYLPREDNISOLONE SODIUM SUCCINATE SCH MG: 40 INJECTION, POWDER, FOR SOLUTION INTRAMUSCULAR; INTRAVENOUS at 19:59

## 2018-02-11 RX ADMIN — GUAIFENESIN SCH MG: 600 TABLET, EXTENDED RELEASE ORAL at 19:58

## 2018-02-11 RX ADMIN — ACETYLCYSTEINE SCH ML: 200 SOLUTION ORAL; RESPIRATORY (INHALATION) at 12:00

## 2018-02-11 RX ADMIN — ACETYLCYSTEINE SCH ML: 200 INHALANT RESPIRATORY (INHALATION) at 23:55

## 2018-02-11 RX ADMIN — PHENYTOIN SODIUM SCH MLS/HR: 50 INJECTION INTRAMUSCULAR; INTRAVENOUS at 23:57

## 2018-02-11 NOTE — HHI.PR
Subjective


Remarks


Tired





Objective





Vital Signs








  Date Time  Temp Pulse Resp B/P (MAP) Pulse Ox O2 Delivery O2 Flow Rate FiO2


 


2/11/18 12:22     97 BiPAP  40


 


2/11/18 12:19     96   40


 


2/11/18 09:16     96 BiPAP  50


 


2/11/18 09:00     96   50


 


2/11/18 06:00  79      


 


2/11/18 05:00  77      


 


2/11/18 04:00  72      


 


2/11/18 04:00 98.6 72 18 120/53 (75) 93   


 


2/11/18 04:00      Nasal Cannula 2.00 


 


2/11/18 03:00  74      


 


2/11/18 02:00  76      


 


2/11/18 01:00  79      


 


2/11/18 00:00      Nasal Cannula 2.00 


 


2/11/18 00:00  79      


 


2/11/18 00:00 98.2 79 18 120/48 (72) 92   


 


2/10/18 23:00  80      


 


2/10/18 22:00  76      


 


2/10/18 21:00  81      


 


2/10/18 20:00      Nasal Cannula 2.00 


 


2/10/18 20:00 98.7 84 18 122/56 (78) 92   


 


2/10/18 20:00  84      


 


2/10/18 16:00 99.1 84 20 127/55 (79) 95   


 


2/10/18 13:00  96      














I/O      


 


 2/10/18 2/10/18 2/10/18 2/11/18 2/11/18 2/11/18





 07:00 15:00 23:00 07:00 15:00 23:00


 


Intake Total 300 ml 1000 ml 522 ml 540 ml  


 


Balance 300 ml 1000 ml 522 ml 540 ml  


 


      


 


Intake Oral 240 ml  360 ml 240 ml  


 


IV Total 60 ml 1000 ml 162 ml 300 ml  


 


# Voids 3  2 2  


 


# Bowel Movements 1  0 0  








Result Diagram:  


2/11/18 0247 2/11/18 0247





Imaging


Alert, oriented, with a CPAP mask





Lungs: ventilated





Heart: S1, S2 regular





Abdomen: soft, no mass





Ext: no edema











Last Impressions








Chest X-Ray 2/10/18 0000 Signed





Impressions: 





 Service Date/Time:  Saturday, February 10, 2018 13:35 - CONCLUSION:  

Persistent 





 right mid and basilar patchy alveolar airspace disease not significantly 

changed 





 allowing for differences in inspiratory effort     Ken Aguilar MD 








Current Medications








 Medications


  (Trade)  Dose


 Ordered  Sig/Cassandra


 Route  Start Time


 Stop Time Status Last Admin


 


  (Heparin Inj)  5,000 units  UNSCH  PRN


 IV  2/10/18 03:45


     


 


 


  (Heparin Inj)  2,500 units  UNSCH  PRN


 IV  2/10/18 03:45


    2/10/18 05:56


 


 


 Heparin Sodium/


 Dextrose  250 ml @ 8


 mls/hr  TITRATE  PRN


 IV  2/9/18 21:45


    2/9/18 22:23


 


 


 Pharmacy Profile


 Note  0 ml @ 0


 mls/hr  UNSCH


 OTHER  2/9/18 22:00


     


 


 


 Sodium Chloride  1,000 ml @ 


 100 mls/hr  Q10H


 IV  2/9/18 21:57


    2/10/18 17:12


 


 


  (NS Flush)  2 ml  UNSCH  PRN


 IV FLUSH  2/9/18 22:00


     


 


 


  (NS Flush)  2 ml  BID


 IV FLUSH  2/10/18 09:00


    2/10/18 19:52


 


 


  (Zofran Inj)  4 mg  Q6H  PRN


 IVP  2/9/18 22:00


    2/10/18 10:35


 


 


  (Tylenol)  650 mg  Q6H  PRN


 PO  2/9/18 22:00


     


 


 


  (Norco  5-325 Mg)  1 tab  Q4H  PRN


 PO  2/9/18 22:00


     


 


 


  (Morphine Inj)  2 mg  Q3H  PRN


 IV PUSH  2/9/18 22:00


     


 


 


  (Marlene-Colace)  1 tab  BID


 PO  2/10/18 09:00


    2/11/18 09:49


 


 


  (Milk Of


 Magnesia Liq)  30 ml  Q12H  PRN


 PO  2/9/18 22:00


     


 


 


  (Senokot)  17.2 mg  Q12H  PRN


 PO  2/9/18 22:00


     


 


 


  (Dulcolax Supp)  10 mg  DAILY  PRN


 RECTAL  2/9/18 22:00


     


 


 


  (Lactulose Liq)  30 ml  DAILY  PRN


 PO  2/9/18 22:00


     


 


 


  (Nitroglycerin


 2% Oint)  0.5 inch  Q6HR  PRN


 TOPICAL  2/9/18 22:00


     


 


 


 Vancomycin/Sodium


 Chloride  200 ml @ 


 200 mls/hr  DAILY@2000


 IV  2/10/18 20:00


    2/10/18 19:51


 


 


 Miscellaneous


 Information  SPECIFIC


 LAB TO BE


 LISA...  ONCE  ONCE


 .XX  2/11/18 19:45


 2/11/18 19:46   


 


 


  (D50w (Vial) Inj)  50 ml  UNSCH  PRN


 IV PUSH  2/10/18 00:00


     


 


 


  (Glucagon Inj)  1 mg  UNSCH  PRN


 OTHER  2/10/18 00:00


     


 


 


  (NovoLOG


 SUPPLEMENTAL


 SCALE)  1  ACHS SLIDING  SCALE


 SQ  2/10/18 08:00


    2/10/18 17:11


 


 


  (Neurontin)  300 mg  BID


 PO  2/10/18 09:00


    2/11/18 09:49


 


 


  (Levemir Inj)  26 units  HS


 SQ  2/10/18 21:00


     


 


 


  (Zoloft)  50 mg  DAILY


 PO  2/10/18 09:00


    2/11/18 09:49


 


 


  (Protonix)  20 mg  DAILY


 PO  2/10/18 09:00


    2/11/18 09:48


 


 


  (Robitussin Liq)  200 mg  Q4H  PRN


 PO  2/10/18 10:45


    2/10/18 19:52


 


 


  (Albuterol Neb)  2.5 mg  Q4HR NEB  PRN


 NEB  2/10/18 10:45


    2/10/18 11:32


 


 


  (Mucinex Er)  600 mg  BID


 PO  2/10/18 21:00


    2/11/18 09:48


 


 


  (Duoneb Neb)  1 ampule  Q4HR  NEB


 NEB  2/11/18 12:00


    2/11/18 12:00


 


 


  (Mucomyst 20%


 Neb)  2 ml  Q4HR  NEB


 NEB  2/11/18 12:00


     


 


 


  (SoluMEDROL INJ)  40 mg  Q12HR


 IV PUSH  2/11/18 21:00


     


 


 


 Cefepime HCl 2000


 mg/Sodium Chloride  100 ml @ 


 200 mls/hr  Q12H


 IV  2/11/18 21:00


     


 











Assessment and Plan


Problem List:  


(1) Shortness of breath


ICD Codes:  R06.02 - Shortness of breath


Plan:  Respiratory failure


CPAP mask


Condition deteriorating


On IV antibiotics





(2) NSTEMI (non-ST elevated myocardial infarction)


ICD Codes:  I21.4 - Non-ST elevation (NSTEMI) myocardial infarction


Plan:  No chest pain reported


Troponin continue to decrease


Will be monitor for now





(3) Sepsis


ICD Codes:  A41.9 - Sepsis, unspecified organism


Plan:  On IV antibiotic














Martina Monroe MD Feb 11, 2018 12:36

## 2018-02-11 NOTE — HHI.PR
Subjective


Remarks


Story distress occurred again this morning.  ABG was obtained and showed 

hypercarbia with respiratory acidosis versus mixed acidosis.  BiPAP has been 

initiated.  Patient is more comfortable on BiPAP.  Recent chest x-ray shows no 

mucus plugging.





Objective





Vital Signs








  Date Time  Temp Pulse Resp B/P (MAP) Pulse Ox O2 Delivery O2 Flow Rate FiO2


 


2/11/18 09:16     96 BiPAP  50


 


2/11/18 09:00     96   50


 


2/11/18 06:00  79      


 


2/11/18 05:00  77      


 


2/11/18 04:00  72      


 


2/11/18 04:00 98.6 72 18 120/53 (75) 93   


 


2/11/18 04:00      Nasal Cannula 2.00 


 


2/11/18 03:00  74      


 


2/11/18 02:00  76      


 


2/11/18 01:00  79      


 


2/11/18 00:00      Nasal Cannula 2.00 


 


2/11/18 00:00  79      


 


2/11/18 00:00 98.2 79 18 120/48 (72) 92   


 


2/10/18 23:00  80      


 


2/10/18 22:00  76      


 


2/10/18 21:00  81      


 


2/10/18 20:00      Nasal Cannula 2.00 


 


2/10/18 20:00 98.7 84 18 122/56 (78) 92   


 


2/10/18 20:00  84      


 


2/10/18 16:00 99.1 84 20 127/55 (79) 95   


 


2/10/18 13:00  96      


 


2/10/18 12:00  99      


 


2/10/18 11:37      Nasal Cannula 2.00 


 


2/10/18 11:15 98.6 113 24 151/62 (91) 92   


 


2/10/18 11:00  118      














I/O      


 


 2/10/18 2/10/18 2/10/18 2/11/18 2/11/18 2/11/18





 07:00 15:00 23:00 07:00 15:00 23:00


 


Intake Total 300 ml 1000 ml 522 ml 540 ml  


 


Balance 300 ml 1000 ml 522 ml 540 ml  


 


      


 


Intake Oral 240 ml  360 ml 240 ml  


 


IV Total 60 ml 1000 ml 162 ml 300 ml  


 


# Voids 3  2 2  


 


# Bowel Movements 1  0 0  








Result Diagram:  


2/11/18 0247 2/11/18 0247





Objective Remarks


GENERAL: NAD, A&Ox3


HEAD: Normocephalic. 


NECK: Supple, trachea midline. No lymphadenopathy.


EYES: No scleral icterus. No injection or drainage. 


CARDIOVASCULAR: Regular rate and rhythm without murmurs, gallops, or rubs. 


RESPIRATORY: Breath sounds equal bilaterally. No accessory muscle use.  

Bilateral rhonchi.


GASTROINTESTINAL: Abdomen soft, non-tender, nondistended. 


MUSCULOSKELETAL: No cyanosis, or edema. 


SKIN: Warm and dry.


NEURO:  No focal neurological deficitis.





A/P


Problem List:  


(1) PNA (pneumonia)


ICD Code:  J18.9 - Pneumonia, unspecified organism


(2) NSTEMI (non-ST elevated myocardial infarction)


ICD Code:  I21.4 - Non-ST elevation (NSTEMI) myocardial infarction


(3) DM (diabetes mellitus)


ICD Code:  E11.9 - Type 2 diabetes mellitus without complications


(4) Sepsis


ICD Code:  A41.9 - Sepsis, unspecified organism


(5) Encephalopathy


ICD Code:  G93.40 - Encephalopathy, unspecified


Assessment and Plan


83-year-old female admitted secondary to pneumonia with sepsis.  Possible 

NSTEMI present.





Respiratory distress this morning.  BiPAP initiated.  Recheck ABG in several 

hours.  Continue schedule duo nebs and Mucomyst.  Continue to treat pneumonia.  

Systemic steroids initiated to help assist with inflammation (she is past the 

acute infectious phase of her pneumonia and out of sepsis).





Sepsis


Resolved


Monitor for any recurrence





Community-acquired pneumonia


COPD


Continue oxygen as needed


Continue vancomycin


Continue Zosyn


Follow CBC


Continue breathing treatments as needed





Possible NSTEMI


Potential elevation in troponin related to pneumonia


Cardiology consulted and following





Diabetes mellitus type 2


Follow blood sugars


Insulin sliding scale


Diabetic diet





DVT prophylaxis


Heparin











Maykel Givens MD Feb 11, 2018 10:20

## 2018-02-12 VITALS — HEART RATE: 86 BPM

## 2018-02-12 VITALS — HEART RATE: 84 BPM

## 2018-02-12 VITALS
HEART RATE: 82 BPM | RESPIRATION RATE: 28 BRPM | OXYGEN SATURATION: 93 % | DIASTOLIC BLOOD PRESSURE: 70 MMHG | TEMPERATURE: 97.7 F | SYSTOLIC BLOOD PRESSURE: 146 MMHG

## 2018-02-12 VITALS
TEMPERATURE: 98.1 F | DIASTOLIC BLOOD PRESSURE: 57 MMHG | SYSTOLIC BLOOD PRESSURE: 123 MMHG | HEART RATE: 78 BPM | RESPIRATION RATE: 18 BRPM | OXYGEN SATURATION: 97 %

## 2018-02-12 VITALS — HEART RATE: 76 BPM

## 2018-02-12 VITALS
TEMPERATURE: 98.3 F | SYSTOLIC BLOOD PRESSURE: 112 MMHG | OXYGEN SATURATION: 93 % | HEART RATE: 83 BPM | DIASTOLIC BLOOD PRESSURE: 44 MMHG | RESPIRATION RATE: 20 BRPM

## 2018-02-12 VITALS
DIASTOLIC BLOOD PRESSURE: 58 MMHG | TEMPERATURE: 98.4 F | OXYGEN SATURATION: 92 % | SYSTOLIC BLOOD PRESSURE: 139 MMHG | RESPIRATION RATE: 30 BRPM | HEART RATE: 79 BPM

## 2018-02-12 VITALS
DIASTOLIC BLOOD PRESSURE: 72 MMHG | OXYGEN SATURATION: 95 % | HEART RATE: 83 BPM | RESPIRATION RATE: 27 BRPM | SYSTOLIC BLOOD PRESSURE: 141 MMHG | TEMPERATURE: 98.6 F

## 2018-02-12 VITALS
HEART RATE: 87 BPM | DIASTOLIC BLOOD PRESSURE: 67 MMHG | TEMPERATURE: 98 F | SYSTOLIC BLOOD PRESSURE: 154 MMHG | RESPIRATION RATE: 24 BRPM | OXYGEN SATURATION: 92 %

## 2018-02-12 VITALS — HEART RATE: 88 BPM

## 2018-02-12 VITALS — OXYGEN SATURATION: 93 %

## 2018-02-12 VITALS — HEART RATE: 75 BPM

## 2018-02-12 VITALS — HEART RATE: 79 BPM

## 2018-02-12 VITALS — HEART RATE: 78 BPM

## 2018-02-12 VITALS — OXYGEN SATURATION: 92 %

## 2018-02-12 VITALS — HEART RATE: 74 BPM

## 2018-02-12 VITALS — HEART RATE: 80 BPM

## 2018-02-12 VITALS — HEART RATE: 77 BPM

## 2018-02-12 VITALS — HEART RATE: 62 BPM

## 2018-02-12 VITALS — HEART RATE: 82 BPM

## 2018-02-12 VITALS — HEART RATE: 94 BPM

## 2018-02-12 LAB
ALBUMIN SERPL-MCNC: 1.7 GM/DL (ref 3.4–5)
ALP SERPL-CCNC: 94 U/L (ref 45–117)
ALT SERPL-CCNC: 9 U/L (ref 10–53)
AST SERPL-CCNC: 12 U/L (ref 15–37)
BASOPHILS # BLD AUTO: 0 TH/MM3 (ref 0–0.2)
BASOPHILS NFR BLD: 0.2 % (ref 0–2)
BILIRUB SERPL-MCNC: 0.5 MG/DL (ref 0.2–1)
BUN SERPL-MCNC: 16 MG/DL (ref 7–18)
CALCIUM SERPL-MCNC: 8.4 MG/DL (ref 8.5–10.1)
CHLORIDE SERPL-SCNC: 101 MEQ/L (ref 98–107)
CREAT SERPL-MCNC: 0.71 MG/DL (ref 0.5–1)
EOSINOPHIL # BLD: 0 TH/MM3 (ref 0–0.4)
EOSINOPHIL NFR BLD: 0 % (ref 0–4)
ERYTHROCYTE [DISTWIDTH] IN BLOOD BY AUTOMATED COUNT: 14.3 % (ref 11.6–17.2)
GFR SERPLBLD BASED ON 1.73 SQ M-ARVRAT: 79 ML/MIN (ref 89–?)
GLUCOSE SERPL-MCNC: 256 MG/DL (ref 74–106)
HCO3 BLD-SCNC: 22.6 MEQ/L (ref 21–32)
HCT VFR BLD CALC: 34.6 % (ref 35–46)
HGB BLD-MCNC: 11.8 GM/DL (ref 11.6–15.3)
LYMPHOCYTES # BLD AUTO: 1.3 TH/MM3 (ref 1–4.8)
LYMPHOCYTES NFR BLD AUTO: 7.2 % (ref 9–44)
MCH RBC QN AUTO: 31.6 PG (ref 27–34)
MCHC RBC AUTO-ENTMCNC: 34.1 % (ref 32–36)
MCV RBC AUTO: 92.8 FL (ref 80–100)
MONOCYTE #: 0.5 TH/MM3 (ref 0–0.9)
MONOCYTES NFR BLD: 3.1 % (ref 0–8)
NEUTROPHILS # BLD AUTO: 15.7 TH/MM3 (ref 1.8–7.7)
NEUTROPHILS NFR BLD AUTO: 89.5 % (ref 16–70)
PLATELET # BLD: 471 TH/MM3 (ref 150–450)
PMV BLD AUTO: 7.9 FL (ref 7–11)
PROT SERPL-MCNC: 6.2 GM/DL (ref 6.4–8.2)
RBC # BLD AUTO: 3.73 MIL/MM3 (ref 4–5.3)
SODIUM SERPL-SCNC: 134 MEQ/L (ref 136–145)
WBC # BLD AUTO: 17.5 TH/MM3 (ref 4–11)

## 2018-02-12 RX ADMIN — IPRATROPIUM BROMIDE AND ALBUTEROL SULFATE SCH AMPULE: .5; 3 SOLUTION RESPIRATORY (INHALATION) at 23:29

## 2018-02-12 RX ADMIN — PHENYTOIN SODIUM SCH MLS/HR: 50 INJECTION INTRAMUSCULAR; INTRAVENOUS at 19:49

## 2018-02-12 RX ADMIN — IPRATROPIUM BROMIDE AND ALBUTEROL SULFATE SCH AMPULE: .5; 3 SOLUTION RESPIRATORY (INHALATION) at 19:02

## 2018-02-12 RX ADMIN — ACETYLCYSTEINE SCH ML: 200 INHALANT RESPIRATORY (INHALATION) at 19:01

## 2018-02-12 RX ADMIN — INSULIN ASPART SCH: 100 INJECTION, SOLUTION INTRAVENOUS; SUBCUTANEOUS at 12:39

## 2018-02-12 RX ADMIN — VANCOMYCIN HYDROCHLORIDE SCH MLS/HR: 1 INJECTION, SOLUTION INTRAVENOUS at 20:44

## 2018-02-12 RX ADMIN — GUAIFENESIN SCH MG: 600 TABLET, EXTENDED RELEASE ORAL at 09:25

## 2018-02-12 RX ADMIN — IPRATROPIUM BROMIDE AND ALBUTEROL SULFATE SCH AMPULE: .5; 3 SOLUTION RESPIRATORY (INHALATION) at 03:25

## 2018-02-12 RX ADMIN — ACETYLCYSTEINE SCH ML: 200 INHALANT RESPIRATORY (INHALATION) at 23:29

## 2018-02-12 RX ADMIN — STANDARDIZED SENNA CONCENTRATE AND DOCUSATE SODIUM SCH TAB: 8.6; 5 TABLET, FILM COATED ORAL at 19:46

## 2018-02-12 RX ADMIN — PHENYTOIN SODIUM SCH MLS/HR: 50 INJECTION INTRAMUSCULAR; INTRAVENOUS at 10:11

## 2018-02-12 RX ADMIN — ACETYLCYSTEINE SCH ML: 200 INHALANT RESPIRATORY (INHALATION) at 15:26

## 2018-02-12 RX ADMIN — INSULIN ASPART SCH: 100 INJECTION, SOLUTION INTRAVENOUS; SUBCUTANEOUS at 09:20

## 2018-02-12 RX ADMIN — GABAPENTIN SCH MG: 300 CAPSULE ORAL at 19:45

## 2018-02-12 RX ADMIN — METHYLPREDNISOLONE SODIUM SUCCINATE SCH MG: 40 INJECTION, POWDER, FOR SOLUTION INTRAMUSCULAR; INTRAVENOUS at 19:49

## 2018-02-12 RX ADMIN — Medication SCH ML: at 09:25

## 2018-02-12 RX ADMIN — METHYLPREDNISOLONE SODIUM SUCCINATE SCH MG: 40 INJECTION, POWDER, FOR SOLUTION INTRAMUSCULAR; INTRAVENOUS at 09:26

## 2018-02-12 RX ADMIN — IPRATROPIUM BROMIDE AND ALBUTEROL SULFATE SCH AMPULE: .5; 3 SOLUTION RESPIRATORY (INHALATION) at 07:35

## 2018-02-12 RX ADMIN — PANTOPRAZOLE SODIUM SCH MG: 20 TABLET, DELAYED RELEASE ORAL at 09:25

## 2018-02-12 RX ADMIN — SERTRALINE HYDROCHLORIDE SCH MG: 50 TABLET, FILM COATED ORAL at 09:26

## 2018-02-12 RX ADMIN — IPRATROPIUM BROMIDE AND ALBUTEROL SULFATE SCH AMPULE: .5; 3 SOLUTION RESPIRATORY (INHALATION) at 15:28

## 2018-02-12 RX ADMIN — ACYCLOVIR SCH UNITS: 800 TABLET ORAL at 20:45

## 2018-02-12 RX ADMIN — CEFEPIME SCH MLS/HR: 2 INJECTION, POWDER, FOR SOLUTION INTRAVENOUS at 09:24

## 2018-02-12 RX ADMIN — STANDARDIZED SENNA CONCENTRATE AND DOCUSATE SODIUM SCH TAB: 8.6; 5 TABLET, FILM COATED ORAL at 09:25

## 2018-02-12 RX ADMIN — GABAPENTIN SCH MG: 300 CAPSULE ORAL at 09:25

## 2018-02-12 RX ADMIN — INSULIN ASPART SCH: 100 INJECTION, SOLUTION INTRAVENOUS; SUBCUTANEOUS at 17:41

## 2018-02-12 RX ADMIN — IPRATROPIUM BROMIDE AND ALBUTEROL SULFATE SCH AMPULE: .5; 3 SOLUTION RESPIRATORY (INHALATION) at 11:39

## 2018-02-12 RX ADMIN — ACETYLCYSTEINE SCH ML: 200 INHALANT RESPIRATORY (INHALATION) at 03:25

## 2018-02-12 RX ADMIN — CEFEPIME SCH MLS/HR: 2 INJECTION, POWDER, FOR SOLUTION INTRAVENOUS at 19:43

## 2018-02-12 RX ADMIN — Medication SCH ML: at 19:43

## 2018-02-12 RX ADMIN — ACETYLCYSTEINE SCH ML: 200 INHALANT RESPIRATORY (INHALATION) at 07:35

## 2018-02-12 RX ADMIN — INSULIN ASPART SCH: 100 INJECTION, SOLUTION INTRAVENOUS; SUBCUTANEOUS at 20:52

## 2018-02-12 RX ADMIN — ACETYLCYSTEINE SCH ML: 200 INHALANT RESPIRATORY (INHALATION) at 11:39

## 2018-02-12 RX ADMIN — GUAIFENESIN SCH MG: 600 TABLET, EXTENDED RELEASE ORAL at 19:45

## 2018-02-12 NOTE — HHI.PR
Subjective


Remarks


in no acute distress but she's on four liters of oxygen via N/C.


denies chest pain but has some sob.


no fever.





Objective


Vitals





Vital Signs








  Date Time  Temp Pulse Resp B/P (MAP) Pulse Ox O2 Delivery O2 Flow Rate FiO2


 


2/12/18 07:37     92 Nasal Cannula 4.00 


 


2/12/18 07:15     93 Nasal Cannula 3.00 


 


2/12/18 07:15 97.7 82 28 146/70 (95) 93   


 


2/12/18 06:00  76      


 


2/12/18 05:00  74      


 


2/12/18 04:00 98.1 78 18 123/57 (79) 97   


 


2/12/18 04:00  78      


 


2/12/18 04:00      Nasal Cannula 4.00 


 


2/12/18 03:00  79      


 


2/12/18 02:00  80      


 


2/12/18 01:00  79      


 


2/12/18 00:00  83      


 


2/12/18 00:00 98.3 83 20 112/44 (66) 93   


 


2/12/18 00:00      Nasal Cannula 5.00 


 


2/11/18 23:00  79      


 


2/11/18 22:00  80      


 


2/11/18 21:41     96 Nasal Cannula 6.00 


 


2/11/18 21:00  79      


 


2/11/18 20:00 98.1 83 18 137/59 (85) 98   


 


2/11/18 20:00  83      


 


2/11/18 20:00      Nasal Cannula 3.00 


 


2/11/18 15:55     96   50


 


2/11/18 15:00 98.3 80 18 123/55 (77) 97   


 


2/11/18 12:22     97 BiPAP  40


 


2/11/18 12:19     96   40


 


2/11/18 11:00 98.4 77 20 105/49 (67) 97   


 


2/11/18 09:16     96 BiPAP  50














I/O      


 


 2/11/18 2/11/18 2/11/18 2/12/18 2/12/18 2/12/18





 07:00 15:00 23:00 07:00 15:00 23:00


 


Intake Total 540 ml   240 ml  


 


Balance 540 ml   240 ml  


 


      


 


Intake Oral 240 ml   240 ml  


 


IV Total 300 ml     


 


# Voids 2   2  


 


# Bowel Movements 0   1  








Result Diagram:  


2/11/18 0247 2/11/18 0247





Imaging





Last Impressions








Chest X-Ray 2/10/18 0000 Signed





Impressions: 





 Service Date/Time:  Saturday, February 10, 2018 13:35 - CONCLUSION:  

Persistent 





 right mid and basilar patchy alveolar airspace disease not significantly 

changed 





 allowing for differences in inspiratory effort     Ken Aguilar MD 








Objective Remarks


GENERAL: elderly female, with some sob.


CARDIOVASCULAR: Regular rate and regular rhythm without murmurs, gallops, or 

rubs. 


RESPIRATORY: diminished air entry in bases bilaterally. 


GASTROINTESTINAL: Abdomen soft, non-tender, nondistended. 


Normal, active bowel sounds


MUSCULOSKELETAL: Extremities without clubbing, cyanosis, or edema.


NEURO:  Alert & Oriented x4 to person, place, time, situation.  Moves all ext x4


Medications and IVs





Inpatient Medications


Acetaminophen (Tylenol) 650 mg Q6H  PRN PO FEVER/PAIN SCALE 1 TO 2;  Start 2/9/ 18 at 22:00


Acetaminophen/ Hydrocodone Bitart (Norco  5-325 Mg) 1 tab Q4H  PRN PO PAIN 

SCALE 3 TO 5 Last administered on 2/11/18at 19:58;  Start 2/9/18 at 22:00


Acetylcysteine (Mucomyst 20% Neb) 2 ml Q4HR  NEB NEB  Last administered on 2/12/ 18at 07:35;  Start 2/12/18 at 00:00


Albuterol Sulfate (Albuterol Neb) 2.5 mg Q4HR NEB  PRN NEB Wheezing or Dyspnea 

Last administered on 2/10/18at 11:32;  Start 2/10/18 at 10:45


Albuterol/ Ipratropium (Duoneb Neb) 1 ampule Q4HR  NEB NEB  Last administered 

on 2/12/18at 07:35;  Start 2/11/18 at 12:00


Aspirin (Aspirin Chew) 324 mg ONCE  ONCE CHEW  Last administered on 2/9/18at 21:

45;  Start 2/9/18 at 21:45;  Stop 2/9/18 at 21:46;  Status DC


Bisacodyl (Dulcolax Supp) 10 mg DAILY  PRN RECTAL SEVERE CONSITIPATION;  Start 2 /9/18 at 22:00


Cefepime HCl 1000 mg/Sodium Chloride 100 ml @  200 mls/hr Q12H IV  Last 

administered on 2/11/18at 09:48;  Start 2/10/18 at 09:00;  Stop 2/11/18 at 12:02

;  Status DC


Cefepime HCl 2000 mg/Sodium Chloride 100 ml @  200 mls/hr Q12H IV  Last 

administered on 2/11/18at 19:57;  Start 2/11/18 at 21:00


Dextrose (D50w (Vial) Inj) 50 ml UNSCH  PRN IV PUSH HYPOGLYCEMIA-SEE COMMENTS;  

Start 2/10/18 at 00:00


Gabapentin (Neurontin) 300 mg BID PO  Last administered on 2/11/18at 19:58;  

Start 2/10/18 at 09:00


Glucagon (Glucagon Inj) 1 mg UNSCH  PRN OTHER HYPOGLYCEMIA-SEE COMMENTS;  Start 

2/10/18 at 00:00


Guaifenesin (Mucinex Er) 600 mg BID PO  Last administered on 2/11/18at 19:58;  

Start 2/10/18 at 21:00


Guaifenesin (Robitussin Liq) 200 mg Q4H  PRN PO Cough Last administered on 2/11/ 18at 19:58;  Start 2/10/18 at 10:45


Heparin Sodium (Porcine) (Heparin Inj) 2,500 units UNSCH  PRN IV APTT 25 TO 39 

Last administered on 2/10/18at 05:56;  Start 2/10/18 at 03:45


Heparin Sodium/ Dextrose 250 ml @ 8 mls/hr TITRATE  PRN IV Coagulation 

Management Last administered on 2/11/18at 21:09;  Start 2/9/18 at 21:45


Insulin Aspart (NovoLOG SUPPLEMENTAL SCALE) 1 ACHS SLIDING  SCALE SQ  Last 

administered on 2/11/18at 21:10;  Start 2/10/18 at 08:00


Insulin Detemir (Levemir Inj) 26 units HS SQ ;  Start 2/10/18 at 21:00


Lactulose (Lactulose Liq) 30 ml DAILY  PRN PO SEVERE CONSITIPATION;  Start 2/9/ 18 at 22:00


Magnesium Hydroxide (Milk Of Magnesia Liq) 30 ml Q12H  PRN PO Mild constipation

;  Start 2/9/18 at 22:00


Methylprednisolone Sodium Succinate (SoluMEDROL INJ) 40 mg ONCE  ONCE IV PUSH  

Last administered on 2/11/18at 12:47;  Start 2/11/18 at 10:30;  Stop 2/11/18 at 

10:34;  Status DC


Miscellaneous Information SPECIFIC LAB TO BE DRAWN: VANCO TROUGH DATE TO BE 

DRChristy.. ONCE  ONCE .XX ;  Start 2/12/18 at 19:45;  Stop 2/12/18 at 19:46


Morphine Sulfate (Morphine Inj) 2 mg Q3H  PRN IV PUSH Pain 6-10;  Start 2/9/18 

at 22:00


Nitroglycerin (Nitroglycerin 2% Oint) 0.5 inch Q6HR  PRN TOPICAL CHEST PAIN;  

Start 2/9/18 at 22:00


Ondansetron HCl (Zofran Inj) 4 mg Q6H  PRN IVP NAUSEA OR VOMITING Last 

administered on 2/10/18at 10:35;  Start 2/9/18 at 22:00


Pantoprazole Sodium (Protonix) 20 mg DAILY PO  Last administered on 2/11/18at 09

:48;  Start 2/10/18 at 09:00


Pharmacy Profile Note 0 ml @ 0 mls/hr UNSCH OTHER ;  Start 2/9/18 at 22:00


Piperacillin Sod/ Tazobactam Sod 50 ml @  100 mls/hr ONCE  ONCE IV  Last 

administered on 2/9/18at 20:15;  Start 2/9/18 at 20:15;  Stop 2/9/18 at 20:44;  

Status DC


Senna/Docusate Sodium (Marlene-Colace) 1 tab BID PO  Last administered on 2/11/ 18at 19:58;  Start 2/10/18 at 09:00


Sennosides (Senokot) 17.2 mg Q12H  PRN PO Moderate constipation;  Start 2/9/18 

at 22:00


Sertraline HCl (Zoloft) 50 mg DAILY PO  Last administered on 2/11/18at 09:49;  

Start 2/10/18 at 09:00


Sodium Chloride (NS Flush) 2 ml BID IV FLUSH  Last administered on 2/11/18at 19:

57;  Start 2/10/18 at 09:00


Vancomycin HCl 500 mg/Sodium Chloride 100 ml @  200 mls/hr NOW  ONCE IV ;  

Start 2/9/18 at 22:45;  Stop 2/9/18 at 23:14;  Status DC


Vancomycin HCl 1000 mg/Sodium Chloride 250 ml @  250 mls/hr ONCE  ONCE IV  Last 

administered on 2/9/18at 20:15;  Start 2/9/18 at 20:15;  Stop 2/9/18 at 21:14;  

Status DC


Vancomycin/Sodium Chloride 200 ml @  200 mls/hr DAILY@2000 IV  Last 

administered on 2/11/18at 19:56;  Start 2/10/18 at 20:00





A/P


Problem List:  


(1) Sepsis


ICD Code:  A41.9 - Sepsis, unspecified organism


(2) PNA (pneumonia)


ICD Code:  J18.9 - Pneumonia, unspecified organism


(3) NSTEMI (non-ST elevated myocardial infarction)


ICD Code:  I21.4 - Non-ST elevation (NSTEMI) myocardial infarction


(4) DM (diabetes mellitus)


ICD Code:  E11.9 - Type 2 diabetes mellitus without complications


Assessment and Plan


A/P  





Community-acquired pneumonia


acute hypercapnic respiratory failure


COPD


Continue oxygen as needed


Continue vancomycin


Continue Zosyn


continue IV Solumedrol


Follow CBC


Continue breathing treatments as needed


will repeat ABG today.





Possible NSTEMI


Potential elevation in troponin related to pneumonia


Cardiology consulted and following


conservative treatment for now





Diabetes mellitus type 2


Follow blood sugars


continue levemir


Insulin sliding scale


Diabetic diet





DVT prophylaxis


Heparin





DNR status- per my discussion with the patient.


Discharge Planning


patient is still on oxygen via N/C.


awaiting repeated ABG.


not ready for discharge yet.











Sal Starkey MD Feb 12, 2018 09:18

## 2018-02-12 NOTE — PD.CARD.PN
Subjective


Subjective Remarks


Having severe cough spasms with respiratory treatments including Mucomyst.





Objective


Medications





Current Medications








 Medications


  (Trade)  Dose


 Ordered  Sig/Cassandra


 Route  Start Time


 Stop Time Status Last Admin


 


  (Heparin Inj)  5,000 units  UNSCH  PRN


 IV  2/10/18 03:45


     


 


 


  (Heparin Inj)  2,500 units  UNSCH  PRN


 IV  2/10/18 03:45


    2/10/18 05:56


 


 


 Heparin Sodium/


 Dextrose  250 ml @ 8


 mls/hr  TITRATE  PRN


 IV  2/9/18 21:45


    2/11/18 21:09


 


 


 Pharmacy Profile


 Note  0 ml @ 0


 mls/hr  UNSCH


 OTHER  2/9/18 22:00


     


 


 


 Sodium Chloride  1,000 ml @ 


 100 mls/hr  Q10H


 IV  2/9/18 21:57


    2/11/18 13:57


 


 


  (NS Flush)  2 ml  UNSCH  PRN


 IV FLUSH  2/9/18 22:00


     


 


 


  (NS Flush)  2 ml  BID


 IV FLUSH  2/10/18 09:00


    2/11/18 19:57


 


 


  (Zofran Inj)  4 mg  Q6H  PRN


 IVP  2/9/18 22:00


    2/10/18 10:35


 


 


  (Tylenol)  650 mg  Q6H  PRN


 PO  2/9/18 22:00


     


 


 


  (Norco  5-325 Mg)  1 tab  Q4H  PRN


 PO  2/9/18 22:00


    2/11/18 19:58


 


 


  (Morphine Inj)  2 mg  Q3H  PRN


 IV PUSH  2/9/18 22:00


     


 


 


  (Marlene-Colace)  1 tab  BID


 PO  2/10/18 09:00


    2/11/18 19:58


 


 


  (Milk Of


 Magnesia Liq)  30 ml  Q12H  PRN


 PO  2/9/18 22:00


     


 


 


  (Senokot)  17.2 mg  Q12H  PRN


 PO  2/9/18 22:00


     


 


 


  (Dulcolax Supp)  10 mg  DAILY  PRN


 RECTAL  2/9/18 22:00


     


 


 


  (Lactulose Liq)  30 ml  DAILY  PRN


 PO  2/9/18 22:00


     


 


 


  (Nitroglycerin


 2% Oint)  0.5 inch  Q6HR  PRN


 TOPICAL  2/9/18 22:00


     


 


 


 Vancomycin/Sodium


 Chloride  200 ml @ 


 200 mls/hr  DAILY@2000


 IV  2/10/18 20:00


    2/11/18 19:56


 


 


  (D50w (Vial) Inj)  50 ml  UNSCH  PRN


 IV PUSH  2/10/18 00:00


     


 


 


  (Glucagon Inj)  1 mg  UNSCH  PRN


 OTHER  2/10/18 00:00


     


 


 


  (NovoLOG


 SUPPLEMENTAL


 SCALE)  1  ACHS SLIDING  SCALE


 SQ  2/10/18 08:00


    2/11/18 21:10


 


 


  (Neurontin)  300 mg  BID


 PO  2/10/18 09:00


    2/11/18 19:58


 


 


  (Levemir Inj)  26 units  HS


 SQ  2/10/18 21:00


     


 


 


  (Zoloft)  50 mg  DAILY


 PO  2/10/18 09:00


    2/11/18 09:49


 


 


  (Protonix)  20 mg  DAILY


 PO  2/10/18 09:00


    2/11/18 09:48


 


 


  (Robitussin Liq)  200 mg  Q4H  PRN


 PO  2/10/18 10:45


    2/11/18 19:58


 


 


  (Albuterol Neb)  2.5 mg  Q4HR NEB  PRN


 NEB  2/10/18 10:45


    2/10/18 11:32


 


 


  (Mucinex Er)  600 mg  BID


 PO  2/10/18 21:00


    2/11/18 19:58


 


 


  (Duoneb Neb)  1 ampule  Q4HR  NEB


 NEB  2/11/18 12:00


    2/12/18 07:35


 


 


  (SoluMEDROL INJ)  40 mg  Q12HR


 IV PUSH  2/11/18 21:00


    2/11/18 19:59


 


 


 Cefepime HCl 2000


 mg/Sodium Chloride  100 ml @ 


 200 mls/hr  Q12H


 IV  2/11/18 21:00


    2/11/18 19:57


 


 


 Miscellaneous


 Information  SPECIFIC LAB TO BE


 DRAWN: VANCO TROUGH


 DATE TO BE DR...  ONCE  ONCE


 .XX  2/12/18 19:45


 2/12/18 19:46   


 


 


  (Mucomyst 20%


 Neb)  2 ml  Q4HR  NEB


 NEB  2/12/18 00:00


    2/12/18 07:35


 








Vital Signs / I&O





Vital Signs








  Date Time  Temp Pulse Resp B/P (MAP) Pulse Ox O2 Delivery O2 Flow Rate FiO2


 


2/12/18 07:37     92 Nasal Cannula 4.00 


 


2/12/18 07:15     93 Nasal Cannula 3.00 


 


2/12/18 07:15 97.7 82 28 146/70 (95) 93   


 


2/12/18 06:00  76      


 


2/12/18 05:00  74      


 


2/12/18 04:00 98.1 78 18 123/57 (79) 97   


 


2/12/18 04:00  78      


 


2/12/18 04:00      Nasal Cannula 4.00 


 


2/12/18 03:00  79      


 


2/12/18 02:00  80      


 


2/12/18 01:00  79      


 


2/12/18 00:00  83      


 


2/12/18 00:00 98.3 83 20 112/44 (66) 93   


 


2/12/18 00:00      Nasal Cannula 5.00 


 


2/11/18 23:00  79      


 


2/11/18 22:00  80      


 


2/11/18 21:41     96 Nasal Cannula 6.00 


 


2/11/18 21:00  79      


 


2/11/18 20:00 98.1 83 18 137/59 (85) 98   


 


2/11/18 20:00  83      


 


2/11/18 20:00      Nasal Cannula 3.00 


 


2/11/18 15:55     96   50


 


2/11/18 15:00 98.3 80 18 123/55 (77) 97   


 


2/11/18 12:22     97 BiPAP  40


 


2/11/18 12:19     96   40


 


2/11/18 11:00 98.4 77 20 105/49 (67) 97   


 


2/11/18 09:16     96 BiPAP  50


 


2/11/18 09:00     96   50














I/O      


 


 2/11/18 2/11/18 2/11/18 2/12/18 2/12/18 2/12/18





 07:00 15:00 23:00 07:00 15:00 23:00


 


Intake Total 540 ml   240 ml  


 


Balance 540 ml   240 ml  


 


      


 


Intake Oral 240 ml   240 ml  


 


IV Total 300 ml     


 


# Voids 2   2  


 


# Bowel Movements 0   1  








Physical Exam


GENERAL: Well-nourished, well-developed patient.


SKIN: Warm and dry.


HEAD: Normocephalic.


EYES: No scleral icterus. No injection or drainage. 


NECK: Supple, trachea midline. No JVD or lymphadenopathy.


CARDIOVASCULAR: Regular rate and rhythm without murmurs, gallops, or rubs. 


RESPIRATORY: Scattered, coarse rhonchi throughout all anterior lung fields, 

posterior feels diminished with bibasilar crackles.


GASTROINTESTINAL: Abdomen soft, non-tender, nondistended. 


EXTREMITIES: No cyanosis, or edema. 


NEUROLOGICAL: Awake, alert, and oriented to self, place.


Laboratory





Laboratory Tests








Test


  2/11/18


08:38 2/11/18


11:37


 


Blood Gas Puncture Site RT RADIAL  


 


Blood Gas Patient Temperature 98.6  


 


Blood Gas HCO3 22 mmol/L  


 


Blood Gas Base Excess -4.0 mmol/L  


 


Blood Gas Oxygen Saturation 91 %  


 


Arterial Blood pH 7.26  


 


Arterial Blood Partial


Pressure CO2 51 mmHg 


  


 


 


Arterial Blood Partial


Pressure O2 71 mmHg 


  


 


 


Arterial Blood Oxygen Content 16.2 Vol %  


 


Arterial Blood


Carboxyhemoglobin 1.0 % 


  


 


 


Arterial Blood Methemoglobin 1.2 %  


 


Blood Gas Hemoglobin 12.7 G/DL  


 


Oxygen Delivery Device NRBR  


 


Blood Gas Liter Flow 15 L/M  


 


Blood Gas Inspired Oxygen 100 %  


 


Activated Partial


Thromboplast Time 


  42.5 SEC 


 








Imaging





Last Impressions








Chest X-Ray 2/10/18 0000 Signed





Impressions: 





 Service Date/Time:  Saturday, February 10, 2018 13:35 - CONCLUSION:  

Persistent 





 right mid and basilar patchy alveolar airspace disease not significantly 

changed 





 allowing for differences in inspiratory effort     Ken Aguilar MD 











Assessment and Plan


Problem List:  


(1) NSTEMI (non-ST elevated myocardial infarction)


ICD Codes:  I21.4 - Non-ST elevation (NSTEMI) myocardial infarction


Plan:  No chest pain.  Unable to assess dyspnea as patient is having severe 

cough spasms from respiratory treatment including Mucomyst.  Rhonchi throughout 

all lung fields, suspicious for aspiration pneumonia.  Would recommend a speech 

therapy consult to evaluate swallowing function.  Assessment and plan per my 

discussion with Dr. alva.














Antonella Campbell Feb 12, 2018 08:20

## 2018-02-13 VITALS — HEART RATE: 98 BPM

## 2018-02-13 VITALS
DIASTOLIC BLOOD PRESSURE: 73 MMHG | HEART RATE: 60 BPM | SYSTOLIC BLOOD PRESSURE: 162 MMHG | TEMPERATURE: 97.7 F | RESPIRATION RATE: 24 BRPM | OXYGEN SATURATION: 92 %

## 2018-02-13 VITALS — HEART RATE: 77 BPM

## 2018-02-13 VITALS
HEART RATE: 92 BPM | TEMPERATURE: 99.1 F | DIASTOLIC BLOOD PRESSURE: 65 MMHG | RESPIRATION RATE: 20 BRPM | SYSTOLIC BLOOD PRESSURE: 141 MMHG | OXYGEN SATURATION: 92 %

## 2018-02-13 VITALS
RESPIRATION RATE: 30 BRPM | HEART RATE: 98 BPM | OXYGEN SATURATION: 93 % | DIASTOLIC BLOOD PRESSURE: 66 MMHG | TEMPERATURE: 98.3 F | SYSTOLIC BLOOD PRESSURE: 124 MMHG

## 2018-02-13 VITALS — HEART RATE: 80 BPM

## 2018-02-13 VITALS
TEMPERATURE: 98.3 F | OXYGEN SATURATION: 93 % | RESPIRATION RATE: 20 BRPM | SYSTOLIC BLOOD PRESSURE: 136 MMHG | DIASTOLIC BLOOD PRESSURE: 73 MMHG | HEART RATE: 94 BPM

## 2018-02-13 VITALS
HEART RATE: 80 BPM | OXYGEN SATURATION: 90 % | RESPIRATION RATE: 28 BRPM | TEMPERATURE: 98.7 F | DIASTOLIC BLOOD PRESSURE: 75 MMHG | SYSTOLIC BLOOD PRESSURE: 162 MMHG

## 2018-02-13 VITALS — HEART RATE: 66 BPM

## 2018-02-13 VITALS — HEART RATE: 110 BPM

## 2018-02-13 VITALS — HEART RATE: 72 BPM

## 2018-02-13 VITALS
SYSTOLIC BLOOD PRESSURE: 178 MMHG | OXYGEN SATURATION: 89 % | DIASTOLIC BLOOD PRESSURE: 83 MMHG | TEMPERATURE: 98.3 F | HEART RATE: 97 BPM | RESPIRATION RATE: 27 BRPM

## 2018-02-13 VITALS — HEART RATE: 86 BPM

## 2018-02-13 VITALS — OXYGEN SATURATION: 96 %

## 2018-02-13 VITALS — SYSTOLIC BLOOD PRESSURE: 145 MMHG | DIASTOLIC BLOOD PRESSURE: 73 MMHG

## 2018-02-13 VITALS — HEART RATE: 102 BPM

## 2018-02-13 VITALS — HEART RATE: 106 BPM

## 2018-02-13 VITALS — HEART RATE: 70 BPM

## 2018-02-13 VITALS — HEART RATE: 92 BPM

## 2018-02-13 VITALS — HEART RATE: 90 BPM

## 2018-02-13 VITALS — HEART RATE: 76 BPM

## 2018-02-13 VITALS — HEART RATE: 97 BPM

## 2018-02-13 VITALS — OXYGEN SATURATION: 90 %

## 2018-02-13 VITALS — HEART RATE: 74 BPM

## 2018-02-13 LAB
BASOPHILS # BLD AUTO: 0 TH/MM3 (ref 0–0.2)
BASOPHILS NFR BLD: 0.2 % (ref 0–2)
EOSINOPHIL # BLD: 0 TH/MM3 (ref 0–0.4)
EOSINOPHIL NFR BLD: 0 % (ref 0–4)
ERYTHROCYTE [DISTWIDTH] IN BLOOD BY AUTOMATED COUNT: 14.1 % (ref 11.6–17.2)
HCT VFR BLD CALC: 33.5 % (ref 35–46)
HGB BLD-MCNC: 11.5 GM/DL (ref 11.6–15.3)
LYMPHOCYTES # BLD AUTO: 1.3 TH/MM3 (ref 1–4.8)
LYMPHOCYTES NFR BLD AUTO: 6 % (ref 9–44)
MCH RBC QN AUTO: 31.7 PG (ref 27–34)
MCHC RBC AUTO-ENTMCNC: 34.3 % (ref 32–36)
MCV RBC AUTO: 92.4 FL (ref 80–100)
MONOCYTE #: 0.8 TH/MM3 (ref 0–0.9)
MONOCYTES NFR BLD: 3.8 % (ref 0–8)
NEUTROPHILS # BLD AUTO: 18.8 TH/MM3 (ref 1.8–7.7)
NEUTROPHILS NFR BLD AUTO: 90 % (ref 16–70)
PLATELET # BLD: 481 TH/MM3 (ref 150–450)
PMV BLD AUTO: 7.2 FL (ref 7–11)
RBC # BLD AUTO: 3.62 MIL/MM3 (ref 4–5.3)
WBC # BLD AUTO: 20.9 TH/MM3 (ref 4–11)

## 2018-02-13 RX ADMIN — Medication SCH ML: at 09:33

## 2018-02-13 RX ADMIN — ACETYLCYSTEINE SCH ML: 200 INHALANT RESPIRATORY (INHALATION) at 07:52

## 2018-02-13 RX ADMIN — ONDANSETRON PRN MG: 2 INJECTION, SOLUTION INTRAMUSCULAR; INTRAVENOUS at 09:47

## 2018-02-13 RX ADMIN — ACETYLCYSTEINE SCH ML: 200 INHALANT RESPIRATORY (INHALATION) at 21:18

## 2018-02-13 RX ADMIN — ACYCLOVIR SCH UNITS: 800 TABLET ORAL at 21:31

## 2018-02-13 RX ADMIN — CEFEPIME SCH MLS/HR: 2 INJECTION, POWDER, FOR SOLUTION INTRAVENOUS at 09:43

## 2018-02-13 RX ADMIN — STANDARDIZED SENNA CONCENTRATE AND DOCUSATE SODIUM SCH TAB: 8.6; 5 TABLET, FILM COATED ORAL at 09:38

## 2018-02-13 RX ADMIN — INSULIN ASPART SCH: 100 INJECTION, SOLUTION INTRAVENOUS; SUBCUTANEOUS at 21:00

## 2018-02-13 RX ADMIN — ACETYLCYSTEINE SCH ML: 200 INHALANT RESPIRATORY (INHALATION) at 03:36

## 2018-02-13 RX ADMIN — GUAIFENESIN PRN MG: 100 SOLUTION ORAL at 06:10

## 2018-02-13 RX ADMIN — GABAPENTIN SCH MG: 300 CAPSULE ORAL at 09:37

## 2018-02-13 RX ADMIN — PANTOPRAZOLE SODIUM SCH MG: 20 TABLET, DELAYED RELEASE ORAL at 09:37

## 2018-02-13 RX ADMIN — METHYLPREDNISOLONE SODIUM SUCCINATE SCH MG: 40 INJECTION, POWDER, FOR SOLUTION INTRAMUSCULAR; INTRAVENOUS at 21:30

## 2018-02-13 RX ADMIN — IPRATROPIUM BROMIDE AND ALBUTEROL SULFATE SCH AMPULE: .5; 3 SOLUTION RESPIRATORY (INHALATION) at 03:36

## 2018-02-13 RX ADMIN — INSULIN ASPART SCH: 100 INJECTION, SOLUTION INTRAVENOUS; SUBCUTANEOUS at 12:47

## 2018-02-13 RX ADMIN — INSULIN ASPART SCH: 100 INJECTION, SOLUTION INTRAVENOUS; SUBCUTANEOUS at 08:00

## 2018-02-13 RX ADMIN — SERTRALINE HYDROCHLORIDE SCH MG: 50 TABLET, FILM COATED ORAL at 09:37

## 2018-02-13 RX ADMIN — GUAIFENESIN SCH MG: 600 TABLET, EXTENDED RELEASE ORAL at 09:37

## 2018-02-13 RX ADMIN — ACETYLCYSTEINE SCH ML: 200 INHALANT RESPIRATORY (INHALATION) at 11:38

## 2018-02-13 RX ADMIN — ACETYLCYSTEINE SCH ML: 200 INHALANT RESPIRATORY (INHALATION) at 15:14

## 2018-02-13 RX ADMIN — IPRATROPIUM BROMIDE AND ALBUTEROL SULFATE SCH AMPULE: .5; 3 SOLUTION RESPIRATORY (INHALATION) at 15:13

## 2018-02-13 RX ADMIN — GUAIFENESIN SCH MG: 600 TABLET, EXTENDED RELEASE ORAL at 21:29

## 2018-02-13 RX ADMIN — GABAPENTIN SCH MG: 300 CAPSULE ORAL at 21:31

## 2018-02-13 RX ADMIN — HEPARIN SODIUM PRN MLS/HR: 10000 INJECTION, SOLUTION INTRAVENOUS at 01:20

## 2018-02-13 RX ADMIN — CEFEPIME SCH MLS/HR: 2 INJECTION, POWDER, FOR SOLUTION INTRAVENOUS at 21:30

## 2018-02-13 RX ADMIN — IPRATROPIUM BROMIDE AND ALBUTEROL SULFATE SCH AMPULE: .5; 3 SOLUTION RESPIRATORY (INHALATION) at 07:52

## 2018-02-13 RX ADMIN — IPRATROPIUM BROMIDE AND ALBUTEROL SULFATE SCH AMPULE: .5; 3 SOLUTION RESPIRATORY (INHALATION) at 11:38

## 2018-02-13 RX ADMIN — INSULIN ASPART SCH: 100 INJECTION, SOLUTION INTRAVENOUS; SUBCUTANEOUS at 18:15

## 2018-02-13 RX ADMIN — Medication SCH ML: at 21:29

## 2018-02-13 RX ADMIN — VANCOMYCIN HYDROCHLORIDE SCH MLS/HR: 1 INJECTION, SOLUTION INTRAVENOUS at 14:17

## 2018-02-13 RX ADMIN — METHYLPREDNISOLONE SODIUM SUCCINATE SCH MG: 40 INJECTION, POWDER, FOR SOLUTION INTRAMUSCULAR; INTRAVENOUS at 09:37

## 2018-02-13 RX ADMIN — STANDARDIZED SENNA CONCENTRATE AND DOCUSATE SODIUM SCH TAB: 8.6; 5 TABLET, FILM COATED ORAL at 21:00

## 2018-02-13 RX ADMIN — IPRATROPIUM BROMIDE AND ALBUTEROL SULFATE SCH AMPULE: .5; 3 SOLUTION RESPIRATORY (INHALATION) at 21:17

## 2018-02-13 NOTE — HHI.PR
Subjective


Remarks


83 YOWF with Pn,NSTMI,Dementia


Breathing better


Mild sob


No CP


No fever





Objective


Vital Signs





Vital Signs








  Date Time  Temp Pulse Resp B/P (MAP) Pulse Ox O2 Delivery O2 Flow Rate FiO2


 


2/13/18 17:17  86      


 


2/13/18 16:03  72      


 


2/13/18 15:21 98.3 94 20 136/73 (94) 93   


 


2/13/18 15:00  77      


 


2/13/18 14:00  102      


 


2/13/18 13:00  92      


 


2/13/18 12:05 98.3 98 30 124/66 (85) 93   


 


2/13/18 12:00  80      


 


2/13/18 11:00  86      


 


2/13/18 10:00  106      


 


2/13/18 09:31    145/73 (97)    


 


2/13/18 09:00  102      


 


2/13/18 08:00  110      


 


2/13/18 07:55 98.3 97 27 178/83 (114) 89   


 


2/13/18 07:55     89 Nasal Cannula 5.00 


 


2/13/18 07:53     90 Nasal Cannula 5.00 


 


2/13/18 07:00  97      


 


2/13/18 06:00  98      


 


2/13/18 05:00  98      


 


2/13/18 04:00  80      


 


2/13/18 04:00 98.7 94 28 162/75 (104) 90   


 


2/13/18 03:00  72      


 


2/13/18 02:00  80      


 


2/13/18 01:00  80      


 


2/13/18 00:00  60      


 


2/13/18 00:00 97.7 88 24 162/73 (102) 92   


 


2/12/18 23:29     93 Nasal Cannula 5.00 


 


2/12/18 23:00  62      


 


2/12/18 22:00  76      


 


2/12/18 21:00  78      


 


2/12/18 20:00 98.0 80 24 154/67 (96) 92   


 


2/12/18 20:00  87      


 


2/12/18 20:00      Nasal Cannula 4.00 


 


2/12/18 19:00  82      


 


2/12/18 18:46  86      














I/O      


 


 2/12/18 2/12/18 2/12/18 2/13/18 2/13/18 2/13/18





 07:00 15:00 23:00 07:00 15:00 23:00


 


Intake Total 240 ml  1780 ml 450 ml  480 ml


 


Output Total   600 ml   1000 ml


 


Balance 240 ml  1180 ml 450 ml  -520 ml


 


      


 


Intake Oral 240 ml  480 ml 200 ml  480 ml


 


IV Total   1300 ml 250 ml  


 


Output Urine Total   600 ml   1000 ml


 


# Voids 2   2  3


 


# Bowel Movements 1  1 2  1








Result Diagram:  


2/13/18 0547                                                                   

             2/12/18 0759





Objective Remarks


GENERAL: Elderly WF, NAD


SKIN: Warm and dry.


HEAD: Normocephalic.


EYES: No scleral icterus. No injection or drainage. 


NECK: Supple, trachea midline. No JVD or lymphadenopathy.


CARDIOVASCULAR: Regular rate and rhythm without murmurs, gallops, or rubs. 


RESPIRATORY: Breath sounds equal bilaterally. No accessory muscle use.


GASTROINTESTINAL: Abdomen soft, non-tender, nondistended. 


MUSCULOSKELETAL: No cyanosis, or edema. 


BACK: Nontender without obvious deformity. No CVA tenderness.








A/P


Assessment and Plan


Pneumonia


Leucocytosis


NSTMI


DM


Hypoxia


Dementia





PLAN:





Cont Abx


Aerosol nebs


supplement 02


Monitor BS











Jareth Quintanilla MD Feb 13, 2018 18:20

## 2018-02-13 NOTE — MB
cc:

[facility]

****

 

 

DATE OF CONSULTATION

02/12/2018

 

REQUESTING PHYSICIAN

Dr. Brianna Tong

 

REASON FOR CONSULTATION

Pneumonia and respiratory insufficiency.

 

HISTORY OF PRESENT ILLNESS

Ms. Berkowitz is a pleasant 83-year-old female with history of hypertension and

dementia.  She lives in a nursing home.  She was sent over here because of

worsening of weakness, fever, confusion and hypoxia.  She was worked up in the

hospital.  Her initial blood gas showed pH of 7.26, pCO2 51, pO2 71 on 100%

oxygen.  She used BiPap briefly.  Currently she is weaned down to nasal

cannula.  She is still somewhat confused and she tells me she lives at her home

with her grandchildren .

 

She has cough and congestion, a small amount of sputum production.  No nausea

or vomiting.

 

PAST MEDICAL HISTORY

Significant for -

1. History of diabetes mellitus.

2. Hypertension .

3. Dementia.

4. History of hysterectomy.

 

MEDICATIONS

She has a history of smoking and smoked one pack per day.  No alcohol use.  She

worked as a .

 

FAMILY HISTORY

She says she has four children and has two grandchildren who live with her.

 

REVIEW OF SYSTEMS

She has no headache or dizziness.  Denies any malignancy.  No DVT or pulmonary

embolism.

 

 

PHYSICAL EXAMINATION

General:  An elderly female mild short of breath.

Vital Signs:  Blood pressure 141/72, heart rate 83, respirations 22,

temperature 93.6.

HEENT Examination:  Pupils are equal and react to light.  She had bilateral

cataract surgery done.  Oral mucosa, nasal mucosa normal.

Neck:  Supple.  JVP not raised.

Chest:  She has rales, more so on the right side.

CV:  S1 and S2 normal.

Abdomen:  Benign.

Extremities:  No edema.

CNS:  The patient is alert and oriented x3.

 

LABORATORY FINDINGS

WBC count 17.5, hemoglobin 11.8, hematocrit 34.6, MCV 92, platelet count 471.

Sodium 134, potassium 3.9, chloride 101, CO2 22, BUN 16, creatinine 0.71.

 

CHEST X-RAY

She has a right patchy diffuse airspace infiltrates.

 

Her blood cultures are negative.

Influenza antigen is negative.

 

IMPRESSION

1. Pneumonia.

2. Hypoxia.

3. Diabetes mellitus.

4. Underlying dementia.

5. Leukocytosis.

 

PLAN

We will continue antibiotics, cefepime and vancomycin, give her aerosol with

albuterol/Atrovent, supplement her oxygen, keep the saturation greater than

90%.  Monitor her blood sugar and monitor her CBC.

 

Further treatment will depend on her course in the hospital.

 

Thank you Dr. Brianna Tong for this consult.

 

 

                              _________________________________

                              Lamont Arriaza MD

 

 

 

ADA/SSB

D:  2/12/2018/6:59 PM

T:  2/13/2018/6:09 AM

Visit #:  [id number]

Job #:  [id number]

[place]

## 2018-02-13 NOTE — HHI.PR
Subjective


Remarks


in no acute distress.


but still on four liters of oxygen via N/C.


sounds congested with on and off cough.


no fever.


d/w the RN at the bedside.





Objective


Vitals





Vital Signs








  Date Time  Temp Pulse Resp B/P (MAP) Pulse Ox O2 Delivery O2 Flow Rate FiO2


 


2/13/18 12:05 98.3 98 30 124/66 (85) 93   


 


2/13/18 10:00  106      


 


2/13/18 09:31    145/73 (97)    


 


2/13/18 09:00  102      


 


2/13/18 08:00  110      


 


2/13/18 07:55 98.3 97 27 178/83 (114) 89   


 


2/13/18 07:55     89 Nasal Cannula 5.00 


 


2/13/18 07:53     90 Nasal Cannula 5.00 


 


2/13/18 07:00  97      


 


2/13/18 06:00  98      


 


2/13/18 05:00  98      


 


2/13/18 04:00  80      


 


2/13/18 04:00 98.7 94 28 162/75 (104) 90   


 


2/13/18 03:00  72      


 


2/13/18 02:00  80      


 


2/13/18 01:00  80      


 


2/13/18 00:00  60      


 


2/13/18 00:00 97.7 88 24 162/73 (102) 92   


 


2/12/18 23:29     93 Nasal Cannula 5.00 


 


2/12/18 23:00  62      


 


2/12/18 22:00  76      


 


2/12/18 21:00  78      


 


2/12/18 20:00 98.0 80 24 154/67 (96) 92   


 


2/12/18 20:00  87      


 


2/12/18 20:00      Nasal Cannula 4.00 


 


2/12/18 19:00  82      


 


2/12/18 18:46  86      


 


2/12/18 17:00  94      


 


2/12/18 16:00  84      


 


2/12/18 15:31 98.6 83 27 141/72 (95) 95   


 


2/12/18 15:28     93 Nasal Cannula 6.00 


 


2/12/18 15:00  75      


 


2/12/18 14:00  86      


 


2/12/18 13:00  84      














I/O      


 


 2/12/18 2/12/18 2/12/18 2/13/18 2/13/18 2/13/18





 07:00 15:00 23:00 07:00 15:00 23:00


 


Intake Total 240 ml  1780 ml 450 ml  


 


Output Total   600 ml   


 


Balance 240 ml  1180 ml 450 ml  


 


      


 


Intake Oral 240 ml  480 ml 200 ml  


 


IV Total   1300 ml 250 ml  


 


Output Urine Total   600 ml   


 


# Voids 2   2  


 


# Bowel Movements 1  1 2  








Result Diagram:  


2/13/18 0547                                                                   

             2/12/18 0759





Imaging





Last Impressions








Chest X-Ray 2/13/18 0600 Signed





Impressions: 





 Service Date/Time:  Tuesday, February 13, 2018 04:17 - CONCLUSION:  Worsening 





 appearance of the chest.     Serafin Tom MD 








Objective Remarks


GENERAL: elderly female, with some sob.


CARDIOVASCULAR: Regular rate and regular rhythm without murmurs, gallops, or 

rubs. 


RESPIRATORY: diminished air entry in bases bilaterally. 


GASTROINTESTINAL: Abdomen soft, non-tender, nondistended. 


Normal, active bowel sounds


MUSCULOSKELETAL: Extremities without clubbing, cyanosis, or edema.


NEURO:  Alert & Oriented x4 to person, place, time, situation.  Moves all ext x4


Medications and IVs





Inpatient Medications


Acetaminophen (Tylenol) 650 mg Q6H  PRN PO FEVER/PAIN SCALE 1 TO 2;  Start 2/9/ 18 at 22:00


Acetaminophen/ Hydrocodone Bitart (Norco  5-325 Mg) 1 tab Q4H  PRN PO PAIN 

SCALE 3 TO 5 Last administered on 2/11/18at 19:58;  Start 2/9/18 at 22:00


Acetylcysteine (Mucomyst 20% Neb) 2 ml Q4HR  NEB NEB  Last administered on 2/13/ 18at 11:38;  Start 2/12/18 at 00:00


Albuterol Sulfate (Albuterol Neb) 2.5 mg Q4HR NEB  PRN NEB Wheezing or Dyspnea 

Last administered on 2/10/18at 11:32;  Start 2/10/18 at 10:45


Albuterol/ Ipratropium (Duoneb Neb) 1 ampule Q4HR  NEB NEB  Last administered 

on 2/13/18at 11:38;  Start 2/11/18 at 12:00


Aspirin (Aspirin Chew) 324 mg ONCE  ONCE CHEW  Last administered on 2/9/18at 21:

45;  Start 2/9/18 at 21:45;  Stop 2/9/18 at 21:46;  Status DC


Bisacodyl (Dulcolax Supp) 10 mg DAILY  PRN RECTAL SEVERE CONSITIPATION;  Start 2 /9/18 at 22:00


Cefepime HCl 1000 mg/Sodium Chloride 100 ml @  200 mls/hr Q12H IV  Last 

administered on 2/11/18at 09:48;  Start 2/10/18 at 09:00;  Stop 2/11/18 at 12:02

;  Status DC


Cefepime HCl 2000 mg/Sodium Chloride 100 ml @  200 mls/hr Q12H IV  Last 

administered on 2/13/18at 09:43;  Start 2/11/18 at 21:00


Dextrose (D50w (Vial) Inj) 50 ml UNSCH  PRN IV PUSH HYPOGLYCEMIA-SEE COMMENTS;  

Start 2/10/18 at 00:00


Gabapentin (Neurontin) 300 mg BID PO  Last administered on 2/13/18at 09:37;  

Start 2/10/18 at 09:00


Glucagon (Glucagon Inj) 1 mg UNSCH  PRN OTHER HYPOGLYCEMIA-SEE COMMENTS;  Start 

2/10/18 at 00:00


Guaifenesin (Mucinex Er) 600 mg BID PO  Last administered on 2/13/18at 09:37;  

Start 2/10/18 at 21:00


Guaifenesin (Robitussin Liq) 200 mg Q4H  PRN PO Cough Last administered on 2/13/ 18at 06:10;  Start 2/10/18 at 10:45


Heparin Sodium (Porcine) (Heparin Inj) 2,500 units UNSCH  PRN IV APTT 25 TO 39 

Last administered on 2/10/18at 05:56;  Start 2/10/18 at 03:45


Heparin Sodium/ Dextrose 250 ml @ 8 mls/hr TITRATE  PRN IV Coagulation 

Management Last administered on 2/13/18at 01:20;  Start 2/9/18 at 21:45


Insulin Aspart (NovoLOG SUPPLEMENTAL SCALE) 1 ACHS SLIDING  SCALE SQ  Last 

administered on 2/12/18at 20:52;  Start 2/10/18 at 08:00


Insulin Detemir (Levemir Inj) 26 units HS SQ  Last administered on 2/12/18at 20:

45;  Start 2/10/18 at 21:00


Lactulose (Lactulose Liq) 30 ml DAILY  PRN PO SEVERE CONSITIPATION;  Start 2/9/ 18 at 22:00


Magnesium Hydroxide (Milk Of Magnesia Liq) 30 ml Q12H  PRN PO Mild constipation

;  Start 2/9/18 at 22:00


Methylprednisolone Sodium Succinate (SoluMEDROL INJ) 40 mg ONCE  ONCE IV PUSH  

Last administered on 2/11/18at 12:47;  Start 2/11/18 at 10:30;  Stop 2/11/18 at 

10:34;  Status DC


Miscellaneous Information SPECIFIC LAB TO BE DRAWN:[facility] TROUGH DATE TO BE 

DR... ONCE  ONCE .XX ;  Start 2/14/18 at 07:45;  Stop 2/14/18 at 07:46


Morphine Sulfate (Morphine Inj) 2 mg Q3H  PRN IV PUSH Pain 6-10;  Start 2/9/18 

at 22:00


Nitroglycerin (Nitroglycerin 2% Oint) 0.5 inch Q6HR  PRN TOPICAL CHEST PAIN;  

Start 2/9/18 at 22:00


Ondansetron HCl (Zofran Inj) 4 mg Q6H  PRN IVP NAUSEA OR VOMITING Last 

administered on 2/13/18at 09:47;  Start 2/9/18 at 22:00


Pantoprazole Sodium (Protonix) 20 mg DAILY PO  Last administered on 2/13/18at 09

:37;  Start 2/10/18 at 09:00


Pharmacy Profile Note 0 ml @ 0 mls/hr UNSCH OTHER ;  Start 2/9/18 at 22:00


Piperacillin Sod/ Tazobactam Sod 50 ml @  100 mls/hr ONCE  ONCE IV  Last 

administered on 2/9/18at 20:15;  Start 2/9/18 at 20:15;  Stop 2/9/18 at 20:44;  

Status DC


Senna/Docusate Sodium (Marlene-Colace) 1 tab BID PO  Last administered on 2/12/ 18at 09:25;  Start 2/10/18 at 09:00


Sennosides (Senokot) 17.2 mg Q12H  PRN PO Moderate constipation;  Start 2/9/18 

at 22:00


Sertraline HCl (Zoloft) 50 mg DAILY PO  Last administered on 2/13/18at 09:37;  

Start 2/10/18 at 09:00


Sodium Chloride (NS Flush) 2 ml BID IV FLUSH  Last administered on 2/12/18at 09:

25;  Start 2/10/18 at 09:00


Vancomycin HCl 500 mg/Sodium Chloride 100 ml @  200 mls/hr NOW  ONCE IV ;  

Start 2/9/18 at 22:45;  Stop 2/9/18 at 23:14;  Status DC


Vancomycin HCl 1000 mg/Sodium Chloride 250 ml @  250 mls/hr ONCE  ONCE IV  Last 

administered on 2/9/18at 20:15;  Start 2/9/18 at 20:15;  Stop 2/9/18 at 21:14;  

Status DC


Vancomycin/Sodium Chloride 200 ml @  200 mls/hr Q18H IV ;  Start 2/13/18 at 14:

00





A/P


Problem List:  


(1) Sepsis


ICD Code:  A41.9 - Sepsis, unspecified organism


(2) PNA (pneumonia)


ICD Code:  J18.9 - Pneumonia, unspecified organism


(3) NSTEMI (non-ST elevated myocardial infarction)


ICD Code:  I21.4 - Non-ST elevation (NSTEMI) myocardial infarction


(4) DM (diabetes mellitus)


ICD Code:  E11.9 - Type 2 diabetes mellitus without complications


Assessment and Plan


A/P  





Community-acquired pneumonia


acute hypercapnic respiratory failure


COPD


Continue oxygen as needed


Continue vancomycin


Continue Zosyn


continue IV Solumedrol


Follow CBC


Continue breathing treatments as needed


pulmonary consult appreciated


will consult ST for swallow evaluation





Possible NSTEMI


possible fluid overload


Potential elevation in troponin related to pneumonia


Cardiology consulted and following


conservative treatment for now


will dc heparin drip soon- when ok with cardiology.


hold IV fluid- one dose of Lasix today


check echo


stress test when more stable





leukocytosis- worse today


continue antibiotics- will monitor temps- CBC in am.





Diabetes mellitus type 2


Follow blood sugars


continue levemir


Insulin sliding scale


Diabetic diet





DVT prophylaxis


Heparin





DNR status- per my discussion with the patient.


Discharge Planning


patient is still on oxygen via N/C.


awaiting repeated ABG.


not ready for discharge yet.











Sal Starkey MD Feb 13, 2018 12:23

## 2018-02-13 NOTE — HHI.PR
Subjective


Remarks


Feeling better





Objective





Vital Signs








  Date Time  Temp Pulse Resp B/P (MAP) Pulse Ox O2 Delivery O2 Flow Rate FiO2


 


2/13/18 12:05 98.3 98 30 124/66 (85) 93   


 


2/13/18 10:00  106      


 


2/13/18 09:31    145/73 (97)    


 


2/13/18 09:00  102      


 


2/13/18 08:00  110      


 


2/13/18 07:55 98.3 97 27 178/83 (114) 89   


 


2/13/18 07:55     89 Nasal Cannula 5.00 


 


2/13/18 07:53     90 Nasal Cannula 5.00 


 


2/13/18 07:00  97      


 


2/13/18 06:00  98      


 


2/13/18 05:00  98      


 


2/13/18 04:00  80      


 


2/13/18 04:00 98.7 94 28 162/75 (104) 90   


 


2/13/18 03:00  72      


 


2/13/18 02:00  80      


 


2/13/18 01:00  80      


 


2/13/18 00:00  60      


 


2/13/18 00:00 97.7 88 24 162/73 (102) 92   


 


2/12/18 23:29     93 Nasal Cannula 5.00 


 


2/12/18 23:00  62      


 


2/12/18 22:00  76      


 


2/12/18 21:00  78      


 


2/12/18 20:00 98.0 80 24 154/67 (96) 92   


 


2/12/18 20:00  87      


 


2/12/18 20:00      Nasal Cannula 4.00 


 


2/12/18 19:00  82      


 


2/12/18 18:46  86      


 


2/12/18 17:00  94      


 


2/12/18 16:00  84      


 


2/12/18 15:31 98.6 83 27 141/72 (95) 95   


 


2/12/18 15:28     93 Nasal Cannula 6.00 


 


2/12/18 15:00  75      


 


2/12/18 14:00  86      














I/O      


 


 2/12/18 2/12/18 2/12/18 2/13/18 2/13/18 2/13/18





 07:00 15:00 23:00 07:00 15:00 23:00


 


Intake Total 240 ml  1780 ml 450 ml  


 


Output Total   600 ml   


 


Balance 240 ml  1180 ml 450 ml  


 


      


 


Intake Oral 240 ml  480 ml 200 ml  


 


IV Total   1300 ml 250 ml  


 


Output Urine Total   600 ml   


 


# Voids 2   2  


 


# Bowel Movements 1  1 2  








Result Diagram:  


2/13/18 0547                                                                   

             2/12/18 0759





Imaging


Alert, fully oriented





Lungs: ventilated





Heart: S1, S2 regular, no gallop





Abdomen: soft, no mass





Ext: No edema








Last Impressions








Chest X-Ray 2/13/18 0600 Signed





Impressions: 





 Service Date/Time:  Tuesday, February 13, 2018 04:17 - CONCLUSION:  Worsening 





 appearance of the chest.     Serafin Tom MD 








Current Medications








 Medications


  (Trade)  Dose


 Ordered  Sig/Cassandra


 Route  Start Time


 Stop Time Status Last Admin


 


  (Heparin Inj)  5,000 units  UNSCH  PRN


 IV  2/10/18 03:45


     


 


 


  (Heparin Inj)  2,500 units  UNSCH  PRN


 IV  2/10/18 03:45


    2/10/18 05:56


 


 


 Heparin Sodium/


 Dextrose  250 ml @ 8


 mls/hr  TITRATE  PRN


 IV  2/9/18 21:45


    2/13/18 01:20


 


 


 Pharmacy Profile


 Note  0 ml @ 0


 mls/hr  UNSCH


 OTHER  2/9/18 22:00


     


 


 


 Sodium Chloride  1,000 ml @ 


 100 mls/hr  Q10H


 IV  2/9/18 21:57


   Future Hold 2/12/18 19:49


 


 


  (NS Flush)  2 ml  UNSCH  PRN


 IV FLUSH  2/9/18 22:00


     


 


 


  (NS Flush)  2 ml  BID


 IV FLUSH  2/10/18 09:00


    2/12/18 09:25


 


 


  (Zofran Inj)  4 mg  Q6H  PRN


 IVP  2/9/18 22:00


    2/13/18 09:47


 


 


  (Tylenol)  650 mg  Q6H  PRN


 PO  2/9/18 22:00


     


 


 


  (Norco  5-325 Mg)  1 tab  Q4H  PRN


 PO  2/9/18 22:00


    2/11/18 19:58


 


 


  (Morphine Inj)  2 mg  Q3H  PRN


 IV PUSH  2/9/18 22:00


     


 


 


  (Marlene-Colace)  1 tab  BID


 PO  2/10/18 09:00


    2/12/18 09:25


 


 


  (Milk Of


 Magnesia Liq)  30 ml  Q12H  PRN


 PO  2/9/18 22:00


     


 


 


  (Senokot)  17.2 mg  Q12H  PRN


 PO  2/9/18 22:00


     


 


 


  (Dulcolax Supp)  10 mg  DAILY  PRN


 RECTAL  2/9/18 22:00


     


 


 


  (Lactulose Liq)  30 ml  DAILY  PRN


 PO  2/9/18 22:00


     


 


 


  (Nitroglycerin


 2% Oint)  0.5 inch  Q6HR  PRN


 TOPICAL  2/9/18 22:00


     


 


 


  (D50w (Vial) Inj)  50 ml  UNSCH  PRN


 IV PUSH  2/10/18 00:00


     


 


 


  (Glucagon Inj)  1 mg  UNSCH  PRN


 OTHER  2/10/18 00:00


     


 


 


  (NovoLOG


 SUPPLEMENTAL


 SCALE)  1  ACHS SLIDING  SCALE


 SQ  2/10/18 08:00


    2/13/18 12:47


 


 


  (Neurontin)  300 mg  BID


 PO  2/10/18 09:00


    2/13/18 09:37


 


 


  (Levemir Inj)  26 units  HS


 SQ  2/10/18 21:00


    2/12/18 20:45


 


 


  (Zoloft)  50 mg  DAILY


 PO  2/10/18 09:00


    2/13/18 09:37


 


 


  (Protonix)  20 mg  DAILY


 PO  2/10/18 09:00


    2/13/18 09:37


 


 


  (Robitussin Liq)  200 mg  Q4H  PRN


 PO  2/10/18 10:45


    2/13/18 06:10


 


 


  (Albuterol Neb)  2.5 mg  Q4HR NEB  PRN


 NEB  2/10/18 10:45


    2/10/18 11:32


 


 


  (Mucinex Er)  600 mg  BID


 PO  2/10/18 21:00


    2/13/18 09:37


 


 


  (Duoneb Neb)  1 ampule  Q4HR  NEB


 NEB  2/11/18 12:00


    2/13/18 11:38


 


 


  (SoluMEDROL INJ)  40 mg  Q12HR


 IV PUSH  2/11/18 21:00


    2/13/18 09:37


 


 


 Cefepime HCl 2000


 mg/Sodium Chloride  100 ml @ 


 200 mls/hr  Q12H


 IV  2/11/18 21:00


    2/13/18 09:43


 


 


  (Mucomyst 20%


 Neb)  2 ml  Q4HR  NEB


 NEB  2/12/18 00:00


    2/13/18 11:38


 


 


 Vancomycin/Sodium


 Chloride  200 ml @ 


 200 mls/hr  Q18H


 IV  2/13/18 14:00


     


 


 


 Miscellaneous


 Information  SPECIFIC LAB TO BE


 DRAWN:[facility] TROUGH


 DATE TO BE DRChristy..  ONCE  ONCE


 .XX  2/14/18 07:45


 2/14/18 07:46   


 











Assessment and Plan


Problem List:  


(1) Shortness of breath


ICD Codes:  R06.02 - Shortness of breath


Plan:  Doing better


SOB improve





(2) NSTEMI (non-ST elevated myocardial infarction)


ICD Codes:  I21.4 - Non-ST elevation (NSTEMI) myocardial infarction


Plan:  No chest pain


DNR


 No need for stress study


Heparin DC


Medical management





(3) Sepsis


ICD Codes:  A41.9 - Sepsis, unspecified organism


Plan:  On IV antibiotic














Martina Monroe MD Feb 13, 2018 13:36

## 2018-02-14 VITALS
RESPIRATION RATE: 18 BRPM | HEART RATE: 80 BPM | DIASTOLIC BLOOD PRESSURE: 75 MMHG | OXYGEN SATURATION: 92 % | SYSTOLIC BLOOD PRESSURE: 144 MMHG | TEMPERATURE: 98.8 F

## 2018-02-14 VITALS — HEART RATE: 71 BPM

## 2018-02-14 VITALS
TEMPERATURE: 98.8 F | HEART RATE: 76 BPM | RESPIRATION RATE: 20 BRPM | OXYGEN SATURATION: 91 % | SYSTOLIC BLOOD PRESSURE: 157 MMHG | DIASTOLIC BLOOD PRESSURE: 78 MMHG

## 2018-02-14 VITALS — OXYGEN SATURATION: 96 %

## 2018-02-14 VITALS
DIASTOLIC BLOOD PRESSURE: 65 MMHG | OXYGEN SATURATION: 94 % | TEMPERATURE: 98.9 F | RESPIRATION RATE: 23 BRPM | HEART RATE: 87 BPM | SYSTOLIC BLOOD PRESSURE: 142 MMHG

## 2018-02-14 VITALS
SYSTOLIC BLOOD PRESSURE: 164 MMHG | HEART RATE: 86 BPM | OXYGEN SATURATION: 95 % | DIASTOLIC BLOOD PRESSURE: 72 MMHG | TEMPERATURE: 98.1 F | RESPIRATION RATE: 17 BRPM

## 2018-02-14 VITALS — HEART RATE: 81 BPM

## 2018-02-14 VITALS — HEART RATE: 86 BPM

## 2018-02-14 VITALS — HEART RATE: 69 BPM

## 2018-02-14 VITALS
RESPIRATION RATE: 18 BRPM | TEMPERATURE: 98.5 F | DIASTOLIC BLOOD PRESSURE: 68 MMHG | HEART RATE: 86 BPM | OXYGEN SATURATION: 90 % | SYSTOLIC BLOOD PRESSURE: 149 MMHG

## 2018-02-14 VITALS — HEART RATE: 70 BPM

## 2018-02-14 VITALS
DIASTOLIC BLOOD PRESSURE: 63 MMHG | OXYGEN SATURATION: 90 % | TEMPERATURE: 98.3 F | RESPIRATION RATE: 18 BRPM | HEART RATE: 93 BPM | SYSTOLIC BLOOD PRESSURE: 139 MMHG

## 2018-02-14 VITALS — HEART RATE: 76 BPM

## 2018-02-14 VITALS
HEART RATE: 77 BPM | TEMPERATURE: 98.4 F | DIASTOLIC BLOOD PRESSURE: 64 MMHG | RESPIRATION RATE: 19 BRPM | OXYGEN SATURATION: 93 % | SYSTOLIC BLOOD PRESSURE: 149 MMHG

## 2018-02-14 VITALS — HEART RATE: 93 BPM

## 2018-02-14 VITALS — HEART RATE: 68 BPM

## 2018-02-14 VITALS — OXYGEN SATURATION: 93 %

## 2018-02-14 VITALS — HEART RATE: 88 BPM

## 2018-02-14 VITALS — HEART RATE: 74 BPM

## 2018-02-14 VITALS — HEART RATE: 87 BPM

## 2018-02-14 VITALS — HEART RATE: 90 BPM

## 2018-02-14 VITALS — OXYGEN SATURATION: 92 %

## 2018-02-14 VITALS — OXYGEN SATURATION: 100 %

## 2018-02-14 LAB
BASOPHILS # BLD AUTO: 0 TH/MM3 (ref 0–0.2)
BASOPHILS NFR BLD: 0.1 % (ref 0–2)
BUN SERPL-MCNC: 11 MG/DL (ref 7–18)
CALCIUM SERPL-MCNC: 8.3 MG/DL (ref 8.5–10.1)
CHLORIDE SERPL-SCNC: 100 MEQ/L (ref 98–107)
CREAT SERPL-MCNC: 0.59 MG/DL (ref 0.5–1)
EOSINOPHIL # BLD: 0 TH/MM3 (ref 0–0.4)
EOSINOPHIL NFR BLD: 0 % (ref 0–4)
ERYTHROCYTE [DISTWIDTH] IN BLOOD BY AUTOMATED COUNT: 14 % (ref 11.6–17.2)
GFR SERPLBLD BASED ON 1.73 SQ M-ARVRAT: 97 ML/MIN (ref 89–?)
GLUCOSE SERPL-MCNC: 159 MG/DL (ref 74–106)
HCO3 BLD-SCNC: 30.1 MEQ/L (ref 21–32)
HCT VFR BLD CALC: 34.1 % (ref 35–46)
HGB BLD-MCNC: 11.7 GM/DL (ref 11.6–15.3)
LYMPHOCYTES # BLD AUTO: 1.2 TH/MM3 (ref 1–4.8)
LYMPHOCYTES NFR BLD AUTO: 10.2 % (ref 9–44)
MCH RBC QN AUTO: 31.7 PG (ref 27–34)
MCHC RBC AUTO-ENTMCNC: 34.3 % (ref 32–36)
MCV RBC AUTO: 92.2 FL (ref 80–100)
MONOCYTE #: 0.6 TH/MM3 (ref 0–0.9)
MONOCYTES NFR BLD: 5.4 % (ref 0–8)
NEUTROPHILS # BLD AUTO: 9.7 TH/MM3 (ref 1.8–7.7)
NEUTROPHILS NFR BLD AUTO: 84.3 % (ref 16–70)
PLATELET # BLD: 409 TH/MM3 (ref 150–450)
PMV BLD AUTO: 7.6 FL (ref 7–11)
RBC # BLD AUTO: 3.69 MIL/MM3 (ref 4–5.3)
SODIUM SERPL-SCNC: 136 MEQ/L (ref 136–145)
WBC # BLD AUTO: 11.5 TH/MM3 (ref 4–11)

## 2018-02-14 RX ADMIN — INSULIN ASPART SCH: 100 INJECTION, SOLUTION INTRAVENOUS; SUBCUTANEOUS at 21:29

## 2018-02-14 RX ADMIN — STANDARDIZED SENNA CONCENTRATE AND DOCUSATE SODIUM SCH TAB: 8.6; 5 TABLET, FILM COATED ORAL at 21:28

## 2018-02-14 RX ADMIN — ACETYLCYSTEINE SCH ML: 200 INHALANT RESPIRATORY (INHALATION) at 11:29

## 2018-02-14 RX ADMIN — INSULIN ASPART SCH: 100 INJECTION, SOLUTION INTRAVENOUS; SUBCUTANEOUS at 18:45

## 2018-02-14 RX ADMIN — METHYLPREDNISOLONE SODIUM SUCCINATE SCH MG: 40 INJECTION, POWDER, FOR SOLUTION INTRAMUSCULAR; INTRAVENOUS at 09:04

## 2018-02-14 RX ADMIN — CEFEPIME SCH MLS/HR: 2 INJECTION, POWDER, FOR SOLUTION INTRAVENOUS at 12:05

## 2018-02-14 RX ADMIN — STANDARDIZED SENNA CONCENTRATE AND DOCUSATE SODIUM SCH TAB: 8.6; 5 TABLET, FILM COATED ORAL at 09:00

## 2018-02-14 RX ADMIN — Medication SCH ML: at 09:05

## 2018-02-14 RX ADMIN — GUAIFENESIN SCH MG: 600 TABLET, EXTENDED RELEASE ORAL at 09:01

## 2018-02-14 RX ADMIN — IPRATROPIUM BROMIDE AND ALBUTEROL SULFATE SCH AMPULE: .5; 3 SOLUTION RESPIRATORY (INHALATION) at 19:25

## 2018-02-14 RX ADMIN — ACETYLCYSTEINE SCH ML: 200 INHALANT RESPIRATORY (INHALATION) at 00:17

## 2018-02-14 RX ADMIN — ACYCLOVIR SCH UNITS: 800 TABLET ORAL at 21:33

## 2018-02-14 RX ADMIN — ACETYLCYSTEINE SCH ML: 200 SOLUTION ORAL; RESPIRATORY (INHALATION) at 15:15

## 2018-02-14 RX ADMIN — IPRATROPIUM BROMIDE AND ALBUTEROL SULFATE SCH AMPULE: .5; 3 SOLUTION RESPIRATORY (INHALATION) at 11:29

## 2018-02-14 RX ADMIN — ACETYLCYSTEINE SCH ML: 200 INHALANT RESPIRATORY (INHALATION) at 03:55

## 2018-02-14 RX ADMIN — INSULIN ASPART SCH: 100 INJECTION, SOLUTION INTRAVENOUS; SUBCUTANEOUS at 09:04

## 2018-02-14 RX ADMIN — METHYLPREDNISOLONE SODIUM SUCCINATE SCH MG: 40 INJECTION, POWDER, FOR SOLUTION INTRAMUSCULAR; INTRAVENOUS at 21:34

## 2018-02-14 RX ADMIN — ACETYLCYSTEINE SCH ML: 200 INHALANT RESPIRATORY (INHALATION) at 07:41

## 2018-02-14 RX ADMIN — IPRATROPIUM BROMIDE AND ALBUTEROL SULFATE SCH AMPULE: .5; 3 SOLUTION RESPIRATORY (INHALATION) at 00:17

## 2018-02-14 RX ADMIN — Medication SCH ML: at 21:36

## 2018-02-14 RX ADMIN — VANCOMYCIN HYDROCHLORIDE SCH MLS/HR: 1 INJECTION, SOLUTION INTRAVENOUS at 08:18

## 2018-02-14 RX ADMIN — IPRATROPIUM BROMIDE AND ALBUTEROL SULFATE SCH AMPULE: .5; 3 SOLUTION RESPIRATORY (INHALATION) at 15:25

## 2018-02-14 RX ADMIN — GUAIFENESIN SCH MG: 600 TABLET, EXTENDED RELEASE ORAL at 21:28

## 2018-02-14 RX ADMIN — SERTRALINE HYDROCHLORIDE SCH MG: 50 TABLET, FILM COATED ORAL at 09:01

## 2018-02-14 RX ADMIN — ACETYLCYSTEINE SCH ML: 200 SOLUTION ORAL; RESPIRATORY (INHALATION) at 19:25

## 2018-02-14 RX ADMIN — GABAPENTIN SCH MG: 300 CAPSULE ORAL at 09:02

## 2018-02-14 RX ADMIN — IPRATROPIUM BROMIDE AND ALBUTEROL SULFATE SCH AMPULE: .5; 3 SOLUTION RESPIRATORY (INHALATION) at 23:50

## 2018-02-14 RX ADMIN — GABAPENTIN SCH MG: 300 CAPSULE ORAL at 21:28

## 2018-02-14 RX ADMIN — CEFEPIME SCH MLS/HR: 2 INJECTION, POWDER, FOR SOLUTION INTRAVENOUS at 22:16

## 2018-02-14 RX ADMIN — INSULIN ASPART SCH: 100 INJECTION, SOLUTION INTRAVENOUS; SUBCUTANEOUS at 12:47

## 2018-02-14 RX ADMIN — IPRATROPIUM BROMIDE AND ALBUTEROL SULFATE SCH AMPULE: .5; 3 SOLUTION RESPIRATORY (INHALATION) at 07:41

## 2018-02-14 RX ADMIN — PANTOPRAZOLE SODIUM SCH MG: 20 TABLET, DELAYED RELEASE ORAL at 09:02

## 2018-02-14 RX ADMIN — ACETYLCYSTEINE SCH ML: 200 SOLUTION ORAL; RESPIRATORY (INHALATION) at 23:50

## 2018-02-14 RX ADMIN — IPRATROPIUM BROMIDE AND ALBUTEROL SULFATE SCH AMPULE: .5; 3 SOLUTION RESPIRATORY (INHALATION) at 03:55

## 2018-02-14 NOTE — HHI.PR
Subjective


Remarks


overall looking better today.


however still on five liters of oxygen via N/C.


awake and alert.


sob improving.


denies pain.


no fever.


d/w the RN at the bedside.





Objective


Vitals





Vital Signs








  Date Time  Temp Pulse Resp B/P (MAP) Pulse Ox O2 Delivery O2 Flow Rate FiO2


 


2/14/18 08:54  88      


 


2/14/18 07:41     93 Nasal Cannula 5.00 


 


2/14/18 07:31     93 Nasal Cannula 5.00 


 


2/14/18 07:24 98.4 77 19 149/64 (92) 93   


 


2/14/18 07:00  86      


 


2/14/18 07:00 98.1 75 17 164/72 (102) 95   


 


2/14/18 06:00  81      


 


2/14/18 05:00  71      


 


2/14/18 04:00  81      


 


2/14/18 03:30 98.8 80 18 144/75 (98) 92   


 


2/14/18 03:00  68      


 


2/14/18 02:00  69      


 


2/14/18 01:00  76      


 


2/14/18 00:00  76      


 


2/14/18 00:00 98.8 76 20 157/78 (104) 91   


 


2/13/18 23:00  76      


 


2/13/18 22:00  70      


 


2/13/18 21:28     96 Nasal Cannula 5.00 


 


2/13/18 21:00  66      


 


2/13/18 20:00 99.1 85 20 141/65 (90) 92   


 


2/13/18 20:00     92 Nasal Cannula 4.00 


 


2/13/18 20:00  92      


 


2/13/18 19:00  74      


 


2/13/18 18:44  90      


 


2/13/18 17:17  86      


 


2/13/18 16:03  72      


 


2/13/18 15:21 98.3 94 20 136/73 (94) 93   


 


2/13/18 15:00  77      


 


2/13/18 14:00  102      


 


2/13/18 13:00  92      


 


2/13/18 12:05 98.3 98 30 124/66 (85) 93   


 


2/13/18 12:00  80      


 


2/13/18 11:00  86      


 


2/13/18 10:00  106      


 


2/13/18 09:31    145/73 (97)    














I/O      


 


 2/13/18 2/13/18 2/13/18 2/14/18 2/14/18 2/14/18





 07:00 15:00 23:00 07:00 15:00 23:00


 


Intake Total 450 ml  480 ml 340 ml  


 


Output Total   1000 ml 300 ml  


 


Balance 450 ml  -520 ml 40 ml  


 


      


 


Intake Oral 200 ml  480 ml 240 ml  


 


IV Total 250 ml   100 ml  


 


Output Urine Total   1000 ml 300 ml  


 


# Voids 2  3 1  


 


# Bowel Movements 2  1 0  








Result Diagram:  


2/14/18 0609                                                                   

             2/14/18 0609





Imaging





Last Impressions








Chest X-Ray 2/13/18 0600 Signed





Impressions: 





 Service Date/Time:  Tuesday, February 13, 2018 04:17 - CONCLUSION:  Worsening 





 appearance of the chest.     Serafin Tom MD 








Objective Remarks


GENERAL: elderly female, with some sob.


CARDIOVASCULAR: Regular rate and regular rhythm without murmurs, gallops, or 

rubs. 


RESPIRATORY: diminished air entry in bases bilaterally. 


GASTROINTESTINAL: Abdomen soft, non-tender, nondistended. 


Normal, active bowel sounds


MUSCULOSKELETAL: Extremities without clubbing, cyanosis, or edema.


NEURO:  Alert & Oriented x4 to person, place, time, situation.  Moves all ext x4


Medications and IVs





Inpatient Medications


Acetaminophen (Tylenol) 650 mg Q6H  PRN PO FEVER/PAIN SCALE 1 TO 2;  Start 2/9/ 18 at 22:00


Acetaminophen/ Hydrocodone Bitart (Norco  5-325 Mg) 1 tab Q4H  PRN PO PAIN 

SCALE 3 TO 5 Last administered on 2/11/18at 19:58;  Start 2/9/18 at 22:00


Acetylcysteine (Mucomyst 20% Neb) 2 ml Q4HR  NEB NEB  Last administered on 2/14/ 18at 07:41;  Start 2/12/18 at 00:00


Albuterol Sulfate (Albuterol Neb) 2.5 mg Q4HR NEB  PRN NEB Wheezing or Dyspnea 

Last administered on 2/10/18at 11:32;  Start 2/10/18 at 10:45


Albuterol/ Ipratropium (Duoneb Neb) 1 ampule Q4HR  NEB NEB  Last administered 

on 2/14/18at 07:41;  Start 2/11/18 at 12:00


Aspirin (Aspirin Chew) 324 mg ONCE  ONCE CHEW  Last administered on 2/9/18at 21:

45;  Start 2/9/18 at 21:45;  Stop 2/9/18 at 21:46;  Status DC


Bisacodyl (Dulcolax Supp) 10 mg DAILY  PRN RECTAL SEVERE CONSITIPATION;  Start 2 /9/18 at 22:00


Cefepime HCl 1000 mg/Sodium Chloride 100 ml @  200 mls/hr Q12H IV  Last 

administered on 2/11/18at 09:48;  Start 2/10/18 at 09:00;  Stop 2/11/18 at 12:02

;  Status DC


Cefepime HCl 2000 mg/Sodium Chloride 100 ml @  200 mls/hr Q12H IV  Last 

administered on 2/13/18at 21:30;  Start 2/11/18 at 21:00


Dextrose (D50w (Vial) Inj) 50 ml UNSCH  PRN IV PUSH HYPOGLYCEMIA-SEE COMMENTS;  

Start 2/10/18 at 00:00


Furosemide (Lasix Inj) 20 mg ONCE  ONCE IV PUSH  Last administered on 2/13/18at 

12:50;  Start 2/13/18 at 12:15;  Stop 2/13/18 at 12:27;  Status DC


Gabapentin (Neurontin) 300 mg BID PO  Last administered on 2/14/18at 09:02;  

Start 2/10/18 at 09:00


Glucagon (Glucagon Inj) 1 mg UNSCH  PRN OTHER HYPOGLYCEMIA-SEE COMMENTS;  Start 

2/10/18 at 00:00


Guaifenesin (Mucinex Er) 600 mg BID PO  Last administered on 2/14/18at 09:01;  

Start 2/10/18 at 21:00


Guaifenesin (Robitussin Liq) 200 mg Q4H  PRN PO Cough Last administered on 2/13/ 18at 06:10;  Start 2/10/18 at 10:45


Heparin Sodium (Porcine) (Heparin Inj) 2,500 units UNSCH  PRN IV APTT 25 TO 39 

Last administered on 2/10/18at 05:56;  Start 2/10/18 at 03:45;  Stop 2/13/18 at 

13:38;  Status DC


Heparin Sodium/ Dextrose 250 ml @ 8 mls/hr TITRATE  PRN IV Coagulation 

Management Last administered on 2/13/18at 01:20;  Start 2/9/18 at 21:45;  Stop 2 /13/18 at 13:38;  Status DC


Insulin Aspart (NovoLOG SUPPLEMENTAL SCALE) 1 ACHS SLIDING  SCALE SQ  Last 

administered on 2/14/18at 09:04;  Start 2/10/18 at 08:00


Insulin Detemir (Levemir Inj) 26 units HS SQ  Last administered on 2/13/18at 21:

31;  Start 2/10/18 at 21:00


Lactulose (Lactulose Liq) 30 ml DAILY  PRN PO SEVERE CONSITIPATION;  Start 2/9/ 18 at 22:00


Magnesium Hydroxide (Milk Of Magnesia Liq) 30 ml Q12H  PRN PO Mild constipation

;  Start 2/9/18 at 22:00


Methylprednisolone Sodium Succinate (SoluMEDROL INJ) 40 mg ONCE  ONCE IV PUSH  

Last administered on 2/11/18at 12:47;  Start 2/11/18 at 10:30;  Stop 2/11/18 at 

10:34;  Status DC


Miscellaneous Information SPECIFIC LAB TO BE DRAWN:[facility] TROUGH DATE TO BE 

DR... ONCE  ONCE .XX ;  Start 2/14/18 at 07:45;  Stop 2/14/18 at 07:46;  Status 

DC


Morphine Sulfate (Morphine Inj) 2 mg Q3H  PRN IV PUSH Pain 6-10;  Start 2/9/18 

at 22:00


Nitroglycerin (Nitroglycerin 2% Oint) 0.5 inch Q6HR  PRN TOPICAL CHEST PAIN;  

Start 2/9/18 at 22:00


Ondansetron HCl (Zofran Inj) 4 mg Q6H  PRN IVP NAUSEA OR VOMITING Last 

administered on 2/13/18at 09:47;  Start 2/9/18 at 22:00


Pantoprazole Sodium (Protonix) 20 mg DAILY PO  Last administered on 2/14/18at 09

:02;  Start 2/10/18 at 09:00


Pharmacy Profile Note 0 ml @ 0 mls/hr UNSCH OTHER ;  Start 2/9/18 at 22:00


Piperacillin Sod/ Tazobactam Sod 50 ml @  100 mls/hr ONCE  ONCE IV  Last 

administered on 2/9/18at 20:15;  Start 2/9/18 at 20:15;  Stop 2/9/18 at 20:44;  

Status DC


Senna/Docusate Sodium (Marlene-Colace) 1 tab BID PO  Last administered on 2/12/ 18at 09:25;  Start 2/10/18 at 09:00


Sennosides (Senokot) 17.2 mg Q12H  PRN PO Moderate constipation;  Start 2/9/18 

at 22:00


Sertraline HCl (Zoloft) 50 mg DAILY PO  Last administered on 2/14/18at 09:01;  

Start 2/10/18 at 09:00


Sodium Chloride (NS Flush) 2 ml BID IV FLUSH  Last administered on 2/14/18at 09:

05;  Start 2/10/18 at 09:00


Vancomycin HCl 500 mg/Sodium Chloride 100 ml @  200 mls/hr NOW  ONCE IV ;  

Start 2/9/18 at 22:45;  Stop 2/9/18 at 23:14;  Status DC


Vancomycin HCl 1000 mg/Sodium Chloride 250 ml @  250 mls/hr ONCE  ONCE IV  Last 

administered on 2/9/18at 20:15;  Start 2/9/18 at 20:15;  Stop 2/9/18 at 21:14;  

Status DC


Vancomycin/Sodium Chloride 200 ml @  200 mls/hr Q18H IV  Last administered on 2/ 14/18at 08:18;  Start 2/13/18 at 14:00





A/P


Problem List:  


(1) Sepsis


ICD Code:  A41.9 - Sepsis, unspecified organism


(2) PNA (pneumonia)


ICD Code:  J18.9 - Pneumonia, unspecified organism


(3) NSTEMI (non-ST elevated myocardial infarction)


ICD Code:  I21.4 - Non-ST elevation (NSTEMI) myocardial infarction


(4) DM (diabetes mellitus)


ICD Code:  E11.9 - Type 2 diabetes mellitus without complications


Assessment and Plan


A/P  





Community-acquired pneumonia


acute hypercapnic respiratory failure


COPD


Continue oxygen as needed


Continue vancomycin


Continue Zosyn


will deescalate the antibiotics soon- when the sputum culture is resulted.


continue IV Solumedrol; continue to taper down.


Follow CBC


Continue breathing treatments as needed


pulmonary consult appreciated


will consult ST for swallow evaluation





Possible NSTEMI


possible fluid overload


Potential elevation in troponin related to pneumonia


Cardiology consulted and following


conservative treatment for now


no further work-up per cardiology.





leukocytosis- improved.


continue antibiotics- will monitor temps- 





Diabetes mellitus type 2


Follow blood sugars


continue levemir


Insulin sliding scale


Diabetic diet





DVT prophylaxis


SCD's





DNR status- per my discussion with the patient.


Discharge Planning


dc to SNF in am if stable.


d/w the patient and Sal Newman MD Feb 14, 2018 09:20

## 2018-02-14 NOTE — HHI.PR
Subjective


Remarks


83 YOWF with Pn,NSTMI,Dementia


Breathing better


Mild sob


No CP


No fever


Looks better





Objective


Vital Signs





Vital Signs








  Date Time  Temp Pulse Resp B/P (MAP) Pulse Ox O2 Delivery O2 Flow Rate FiO2


 


2/14/18 19:28     92 Nasal Cannula 6.00 


 


2/14/18 17:15     96 Non-Rebreather 12.00 


 


2/14/18 17:00  90      


 


2/14/18 16:56     100   50


 


2/14/18 16:00  87      


 


2/14/18 15:25     93 Nasal Cannula 3.00 


 


2/14/18 15:00  86      


 


2/14/18 15:00 98.5 88 18 149/68 (95) 90   


 


2/14/18 12:35  93      


 


2/14/18 11:02 98.3 93 18 139/63 (88) 90   


 


2/14/18 11:00  93      


 


2/14/18 10:29  70      


 


2/14/18 10:00  74      


 


2/14/18 09:34  86      


 


2/14/18 08:54  88      


 


2/14/18 07:41     93 Nasal Cannula 5.00 


 


2/14/18 07:31     93 Nasal Cannula 5.00 


 


2/14/18 07:24 98.4 77 19 149/64 (92) 93   


 


2/14/18 07:00  86      


 


2/14/18 07:00  62      


 


2/14/18 07:00 98.1 75 17 164/72 (102) 95   


 


2/14/18 06:00  81      


 


2/14/18 05:00  71      


 


2/14/18 04:00  81      


 


2/14/18 03:30 98.8 80 18 144/75 (98) 92   


 


2/14/18 03:00  68      


 


2/14/18 02:00  69      


 


2/14/18 01:00  76      


 


2/14/18 00:00  76      


 


2/14/18 00:00 98.8 76 20 157/78 (104) 91   


 


2/13/18 23:00  76      


 


2/13/18 22:00  70      


 


2/13/18 21:28     96 Nasal Cannula 5.00 


 


2/13/18 21:00  66      


 


2/13/18 20:00 99.1 85 20 141/65 (90) 92   


 


2/13/18 20:00     92 Nasal Cannula 4.00 


 


2/13/18 20:00  92      














I/O      


 


 2/13/18 2/13/18 2/13/18 2/14/18 2/14/18 2/14/18





 07:00 15:00 23:00 07:00 15:00 23:00


 


Intake Total 450 ml  480 ml 340 ml 300 ml 720 ml


 


Output Total   1000 ml 300 ml 1100 ml 1000 ml


 


Balance 450 ml  -520 ml 40 ml -800 ml -280 ml


 


      


 


Intake Oral 200 ml  480 ml 240 ml  720 ml


 


IV Total 250 ml   100 ml 300 ml 


 


Output Urine Total   1000 ml 300 ml 1100 ml 1000 ml


 


# Voids 2  3 1  


 


# Bowel Movements 2  1 0  








Result Diagram:  


2/14/18 0609 2/14/18 0609





Objective Remarks


GENERAL: Elderly WF, NAD


SKIN: Warm and dry.


HEAD: Normocephalic.


EYES: No scleral icterus. No injection or drainage. 


NECK: Supple, trachea midline. No JVD or lymphadenopathy.


CARDIOVASCULAR: Regular rate and rhythm without murmurs, gallops, or rubs. 


RESPIRATORY: Breath sounds equal bilaterally. No accessory muscle use.


GASTROINTESTINAL: Abdomen soft, non-tender, nondistended. 


MUSCULOSKELETAL: No cyanosis, or edema. 


BACK: Nontender without obvious deformity. No CVA tenderness.








A/P


Assessment and Plan


Pneumonia


Leucocytosis


NSTMI


DM


Hypoxia


Dementia





PLAN:





Cont Abx


Aerosol nebs


supplement 02


Monitor Jareth Arnold MD Feb 14, 2018 19:42

## 2018-02-14 NOTE — ECHRPT
Indication:   HEART FAILURE

 

 CONCLUSIONS

 Normal left ventricular size. 

 Wall thickness is normal. 

 The left ventricular systolic function is normal (EF 60%). 

 The right ventricle is mildly dilated. 

 The right ventricular systolic function is mildly decreased. 

 The left atrial size is mildly dilated. 

 The right atrial size is mildly dilated. 

 Aortic valve sclerosis is present. 

 There is trace tricuspid valve regurgitation. 

 The estimated pulmonary arterial pressure is 42 mmHg. 

 A right sided pleural effusion is present. 

 

 BP:  124   / 66      HR: 98                       Rhythm:           Sinus

 

 MEASUREMENTS  (Male / Female) Normal Values       Technical Quality:Fair

 2D ECHO

 LV Diastolic Diameter PLAX        4.2 cm                4.2 - 5.9 / 3.9 - 5.3 cm

 LV Systolic Diameter PLAX         2.6 cm                

 IVS Diastolic Thickness           0.8 cm                0.6 - 1.0 / 0.6 - 0.9 cm

 LVPW Diastolic Thickness          0.8 cm                0.6 - 1.0 / 0.6 - 0.9 cm

 LV Relative Wall Thickness        0.4                   

 RV Internal Dim ED PLAX           3.2 cm                

 LVOT Diameter                     1.8 cm                

 Aortic Root Diameter              2.9 cm                

 LA Systolic Diameter LX           2.9 cm                3.0 - 4.0 / 2.7 - 3.8 cm

 

 M-MODE

 AV Cusp Separation MM             1.9 cm                

 

 DOPPLER

 AV Peak Velocity                  127.0 cm/s            

 AV Peak Gradient                  6.5 mmHg              

 AV Mean Gradient                  3.0 mmHg              

 AV Velocity Time Integral         24.2 cm               

 LVOT Peak Velocity                72.9 cm/s             

 LVOT Peak Gradient                2.1 mmHg              

 LVOT Velocity Time Integral       15.7 cm               

 AV Area Cont Eq vti               1.7 cm               

 AV Area Cont Eq pk                1.5 cm               

 Mitral E Point Velocity           90.8 cm/s             

 Mitral A Point Velocity           121.0 cm/s            

 Mitral E to A Ratio               0.8                   

 LV E' Lateral Velocity            5.6 cm/s              

 Mitral E to LV E' Lateral Ratio   16.3                  

 LV E' Septal Velocity             5.7 cm/s              

 Mitral E to LV E' Septal Ratio    16.1                  

 TR Peak Velocity                  284.0 cm/s            

 TR Peak Gradient                  32.3 mmHg             

 Right Atrial Pressure             10.0 mmHg             

 Pulmonary Artery Systolic Pressu  42.3 mmHg             

 Right Ventricular Systolic Press  42.3 mmHg             

 PV Peak Velocity                  80.5 cm/s             

 PV Peak Gradient                  2.6 mmHg              

 

 

 FINDINGS

 

 LEFT VENTRICLE

 Normal left ventricular size. 

 Wall thickness is normal. 

 The left ventricular systolic function is normal (EF 60%). 

 

 RIGHT VENTRICLE

 The right ventricle is mildly dilated. 

 The right ventricular systoilc function is mildly decreased. 

 

 LEFT ATRIUM

 The left atrial size is mildly dilated. 

 

 RIGHT ATRIUM

 The right atrial size is mildly dilated. 

 

 ATRIAL SEPTUM

 No atrial level shunt is demonstrated by color flow Doppler interrogation. 

 

 AORTA

 The aortic root and proximal ascending aorta are normal in size on limited imaging. 

 

 MITRAL VALVE

 Structurally normal mitral valve. No mitral valve stenosis or regurgitation. 

 

 AORTIC VALVE

 Aortic valve sclerosis is present. 

 

 TRICUSPID VALVE

 There is trace tricuspid valve regurgitation. 

 The estimated pulmonary arterial pressure is 42.3 mmHg. 

 

 PULMONARY VALVE

 No pulmonary valve regurgitation or stenosis. 

 

 VESSELS

 The inferior vena cava is normal in size. 

 

 PERICARDIUM

 A right sided pleural effusion is present. 

 

 

 

 

  Camelia Salas MD, FACC

  (Electronically Signed)

  Final Date:14 February 2018 18:09

## 2018-02-15 VITALS
OXYGEN SATURATION: 95 % | DIASTOLIC BLOOD PRESSURE: 81 MMHG | TEMPERATURE: 98.1 F | RESPIRATION RATE: 18 BRPM | HEART RATE: 75 BPM | SYSTOLIC BLOOD PRESSURE: 136 MMHG

## 2018-02-15 VITALS
SYSTOLIC BLOOD PRESSURE: 146 MMHG | DIASTOLIC BLOOD PRESSURE: 67 MMHG | TEMPERATURE: 98.2 F | HEART RATE: 70 BPM | OXYGEN SATURATION: 100 % | RESPIRATION RATE: 20 BRPM

## 2018-02-15 VITALS
SYSTOLIC BLOOD PRESSURE: 151 MMHG | HEART RATE: 70 BPM | TEMPERATURE: 97.9 F | RESPIRATION RATE: 18 BRPM | DIASTOLIC BLOOD PRESSURE: 67 MMHG | OXYGEN SATURATION: 100 %

## 2018-02-15 VITALS
TEMPERATURE: 97.8 F | RESPIRATION RATE: 18 BRPM | OXYGEN SATURATION: 93 % | HEART RATE: 87 BPM | DIASTOLIC BLOOD PRESSURE: 77 MMHG | SYSTOLIC BLOOD PRESSURE: 163 MMHG

## 2018-02-15 VITALS
SYSTOLIC BLOOD PRESSURE: 156 MMHG | OXYGEN SATURATION: 99 % | RESPIRATION RATE: 20 BRPM | DIASTOLIC BLOOD PRESSURE: 70 MMHG | TEMPERATURE: 98.1 F | HEART RATE: 60 BPM

## 2018-02-15 VITALS — HEART RATE: 91 BPM

## 2018-02-15 VITALS
HEART RATE: 81 BPM | TEMPERATURE: 98.5 F | RESPIRATION RATE: 18 BRPM | OXYGEN SATURATION: 96 % | DIASTOLIC BLOOD PRESSURE: 67 MMHG | SYSTOLIC BLOOD PRESSURE: 147 MMHG

## 2018-02-15 VITALS — OXYGEN SATURATION: 78 %

## 2018-02-15 VITALS — HEART RATE: 60 BPM

## 2018-02-15 VITALS — OXYGEN SATURATION: 98 %

## 2018-02-15 VITALS — HEART RATE: 73 BPM

## 2018-02-15 LAB
ERYTHROCYTE [DISTWIDTH] IN BLOOD BY AUTOMATED COUNT: 14 % (ref 11.6–17.2)
HCT VFR BLD CALC: 34.2 % (ref 35–46)
HGB BLD-MCNC: 11.7 GM/DL (ref 11.6–15.3)
MCH RBC QN AUTO: 31.3 PG (ref 27–34)
MCHC RBC AUTO-ENTMCNC: 34.1 % (ref 32–36)
MCV RBC AUTO: 91.7 FL (ref 80–100)
PLATELET # BLD: 379 TH/MM3 (ref 150–450)
PMV BLD AUTO: 7.8 FL (ref 7–11)
RBC # BLD AUTO: 3.73 MIL/MM3 (ref 4–5.3)
WBC # BLD AUTO: 13.6 TH/MM3 (ref 4–11)

## 2018-02-15 RX ADMIN — GABAPENTIN SCH MG: 300 CAPSULE ORAL at 19:53

## 2018-02-15 RX ADMIN — METHYLPREDNISOLONE SODIUM SUCCINATE SCH MG: 40 INJECTION, POWDER, FOR SOLUTION INTRAMUSCULAR; INTRAVENOUS at 08:49

## 2018-02-15 RX ADMIN — ACYCLOVIR SCH UNITS: 800 TABLET ORAL at 22:05

## 2018-02-15 RX ADMIN — IPRATROPIUM BROMIDE AND ALBUTEROL SULFATE SCH AMPULE: .5; 3 SOLUTION RESPIRATORY (INHALATION) at 07:48

## 2018-02-15 RX ADMIN — ACETYLCYSTEINE SCH ML: 200 SOLUTION ORAL; RESPIRATORY (INHALATION) at 03:55

## 2018-02-15 RX ADMIN — INSULIN ASPART SCH: 100 INJECTION, SOLUTION INTRAVENOUS; SUBCUTANEOUS at 22:05

## 2018-02-15 RX ADMIN — METHYLPREDNISOLONE SODIUM SUCCINATE SCH MG: 40 INJECTION, POWDER, FOR SOLUTION INTRAMUSCULAR; INTRAVENOUS at 19:53

## 2018-02-15 RX ADMIN — ALBUTEROL SULFATE PRN MG: 2.5 SOLUTION RESPIRATORY (INHALATION) at 19:00

## 2018-02-15 RX ADMIN — Medication SCH ML: at 08:57

## 2018-02-15 RX ADMIN — VANCOMYCIN HYDROCHLORIDE SCH MLS/HR: 1 INJECTION, SOLUTION INTRAVENOUS at 01:31

## 2018-02-15 RX ADMIN — SERTRALINE HYDROCHLORIDE SCH MG: 50 TABLET, FILM COATED ORAL at 08:50

## 2018-02-15 RX ADMIN — CEFEPIME SCH MLS/HR: 2 INJECTION, POWDER, FOR SOLUTION INTRAVENOUS at 22:05

## 2018-02-15 RX ADMIN — CEFEPIME SCH MLS/HR: 2 INJECTION, POWDER, FOR SOLUTION INTRAVENOUS at 08:57

## 2018-02-15 RX ADMIN — ACETYLCYSTEINE SCH ML: 200 SOLUTION ORAL; RESPIRATORY (INHALATION) at 19:00

## 2018-02-15 RX ADMIN — ACETYLCYSTEINE SCH ML: 200 SOLUTION ORAL; RESPIRATORY (INHALATION) at 11:16

## 2018-02-15 RX ADMIN — INSULIN ASPART SCH: 100 INJECTION, SOLUTION INTRAVENOUS; SUBCUTANEOUS at 12:00

## 2018-02-15 RX ADMIN — IPRATROPIUM BROMIDE AND ALBUTEROL SULFATE SCH AMPULE: .5; 3 SOLUTION RESPIRATORY (INHALATION) at 03:55

## 2018-02-15 RX ADMIN — ALBUTEROL SULFATE PRN MG: 2.5 SOLUTION RESPIRATORY (INHALATION) at 15:17

## 2018-02-15 RX ADMIN — INSULIN ASPART SCH: 100 INJECTION, SOLUTION INTRAVENOUS; SUBCUTANEOUS at 08:52

## 2018-02-15 RX ADMIN — GABAPENTIN SCH MG: 300 CAPSULE ORAL at 08:49

## 2018-02-15 RX ADMIN — PANTOPRAZOLE SODIUM SCH MG: 20 TABLET, DELAYED RELEASE ORAL at 08:49

## 2018-02-15 RX ADMIN — ALBUTEROL SULFATE PRN MG: 2.5 SOLUTION RESPIRATORY (INHALATION) at 23:51

## 2018-02-15 RX ADMIN — STANDARDIZED SENNA CONCENTRATE AND DOCUSATE SODIUM SCH TAB: 8.6; 5 TABLET, FILM COATED ORAL at 08:50

## 2018-02-15 RX ADMIN — INSULIN ASPART SCH: 100 INJECTION, SOLUTION INTRAVENOUS; SUBCUTANEOUS at 17:58

## 2018-02-15 RX ADMIN — Medication SCH ML: at 19:52

## 2018-02-15 RX ADMIN — IPRATROPIUM BROMIDE AND ALBUTEROL SULFATE SCH AMPULE: .5; 3 SOLUTION RESPIRATORY (INHALATION) at 11:15

## 2018-02-15 RX ADMIN — ACETYLCYSTEINE SCH ML: 200 SOLUTION ORAL; RESPIRATORY (INHALATION) at 07:48

## 2018-02-15 RX ADMIN — GUAIFENESIN SCH MG: 600 TABLET, EXTENDED RELEASE ORAL at 08:50

## 2018-02-15 RX ADMIN — ACETYLCYSTEINE SCH ML: 200 SOLUTION ORAL; RESPIRATORY (INHALATION) at 23:51

## 2018-02-15 RX ADMIN — ACETYLCYSTEINE SCH ML: 200 SOLUTION ORAL; RESPIRATORY (INHALATION) at 15:17

## 2018-02-15 RX ADMIN — GUAIFENESIN SCH MG: 600 TABLET, EXTENDED RELEASE ORAL at 19:53

## 2018-02-15 RX ADMIN — VANCOMYCIN HYDROCHLORIDE SCH MLS/HR: 1 INJECTION, SOLUTION INTRAVENOUS at 19:52

## 2018-02-15 NOTE — HHI.PR
Addendum to Inpatient Note


Addendum Reason:  Additional Documentation


Additional Information


Case discussed with patient's daughter and she is agreeable for hospice 

consultation











Hiro Russell MD Feb 15, 2018 15:10

## 2018-02-15 NOTE — HHI.PR
Subjective


Remarks


Follow-up non-ST elevation MI/respiratory failure


02/15/18-patient and examined, still on NRB with significant shortness of breath





Objective


Vitals





Vital Signs








  Date Time  Temp Pulse Resp B/P (MAP) Pulse Ox O2 Delivery O2 Flow Rate FiO2


 


2/15/18 12:12 97.9 70 18 151/67 (95) 100   


 


2/15/18 08:12 98.1 75 18 136/81 (99) 95   


 


2/15/18 07:50     78   


 


2/15/18 04:00 98.1 74 20 156/70 (98) 99   


 


2/15/18 04:00      Non-Rebreather 4.00 


 


2/15/18 04:00  60      


 


2/15/18 00:00  73      


 


2/15/18 00:00 98.2 70 20 146/67 (93) 100   


 


2/15/18 00:00      Non-Rebreather 4.00 


 


2/14/18 20:50 98.9 87 23 142/65 (90) 94   


 


2/14/18 20:00      Nasal Cannula 6.00 


 


2/14/18 20:00  93      


 


2/14/18 19:28     92 Nasal Cannula 6.00 


 


2/14/18 18:25      Non-Rebreather 4.00 


 


2/14/18 17:15     96 Non-Rebreather 12.00 


 


2/14/18 17:00  90      


 


2/14/18 16:56     100   50


 


2/14/18 16:00  87      


 


2/14/18 15:25     93 Nasal Cannula 3.00 


 


2/14/18 15:00  86      


 


2/14/18 15:00 98.5 88 18 149/68 (95) 90   














I/O      


 


 2/14/18 2/14/18 2/14/18 2/15/18 2/15/18 2/15/18





 06:59 14:59 22:59 06:59 14:59 22:59


 


Intake Total 340 ml 300 ml 720 ml 300 ml  


 


Output Total 300 ml 1100 ml 1000 ml   


 


Balance 40 ml -800 ml -280 ml 300 ml  


 


      


 


Intake Oral 240 ml  720 ml   


 


IV Total 100 ml 300 ml  300 ml  


 


Output Urine Total 300 ml 1100 ml 1000 ml   


 


# Voids 1     


 


# Bowel Movements 0     








Result Diagram:  


2/15/18 0435                                                                   

             2/14/18 0609





Imaging





Last Impressions








Chest X-Ray 2/13/18 0600 Signed





Impressions: 





 Service Date/Time:  Tuesday, February 13, 2018 04:17 - CONCLUSION:  Worsening 





 appearance of the chest.     Serafin Tom MD 








Objective Remarks


GENERAL: NAD


SKIN: Warm and dry.


HEAD: Normocephalic.


EYES: No scleral icterus. No injection or drainage. 


NECK: Supple, trachea midline. No JVD or lymphadenopathy.


CARDIOVASCULAR: Regular rate and rhythm without murmurs, gallops, or rubs. 


RESPIRATORY: Breath sounds decrease bilaterally. No accessory muscle use.


GASTROINTESTINAL: Abdomen soft, non-tender, nondistended. 


MUSCULOSKELETAL: No cyanosis, or edema. 


BACK: Nontender without obvious deformity. No CVA tenderness.








A/P


Problem List:  


(1) Sepsis


ICD Code:  A41.9 - Sepsis, unspecified organism


(2) PNA (pneumonia)


ICD Code:  J18.9 - Pneumonia, unspecified organism


(3) NSTEMI (non-ST elevated myocardial infarction)


ICD Code:  I21.4 - Non-ST elevation (NSTEMI) myocardial infarction


(4) DM (diabetes mellitus)


ICD Code:  E11.9 - Type 2 diabetes mellitus without complications


Assessment and Plan


83-year-old female with





Community-acquired pneumonia


acute hypercapnic respiratory failure


COPD


Continue oxygen as needed


Continue vancomycin, Cefepime


continue IV Solu Medrol; continue to taper down.


Continue breathing treatments as needed


pulmonary consult appreciated


Consider Hospice





Possible NSTEMI


possible fluid overload


Potential elevation in troponin related to pneumonia


Cardiology ff


conservative treatment for now and no further work-up per cardiology.





leukocytosis-


continue antibiotics





Diabetes mellitus type 2


Follow blood sugars


continue Levemir


Insulin sliding scale


Diabetic diet











Hiro Russell MD Feb 15, 2018 14:23

## 2018-02-15 NOTE — HHI.PR
Subjective


Remarks


83 YOWF with Pn,NSTMI,Dementia


Breathing better


Mild sob


No CP


No fever


takes off mask





Objective


Vital Signs





Vital Signs








  Date Time  Temp Pulse Resp B/P (MAP) Pulse Ox O2 Delivery O2 Flow Rate FiO2


 


2/15/18 20:09      Nasal Cannula 5.00 


 


2/15/18 17:00  91      


 


2/15/18 16:25 98.5 81 18 147/67 (93) 96   


 


2/15/18 15:17     98 Non-Rebreather 15.00 


 


2/15/18 12:12 97.9 70 18 151/67 (95) 100   


 


2/15/18 12:00  60      


 


2/15/18 08:12 98.1 75 18 136/81 (99) 95   


 


2/15/18 08:00      Non-Rebreather 4.00 


 


2/15/18 08:00  73      


 


2/15/18 07:50     78   


 


2/15/18 04:00 98.1 74 20 156/70 (98) 99   


 


2/15/18 04:00      Non-Rebreather 4.00 


 


2/15/18 04:00  60      


 


2/15/18 00:00  73      


 


2/15/18 00:00 98.2 70 20 146/67 (93) 100   


 


2/15/18 00:00      Non-Rebreather 4.00 


 


2/14/18 20:50 98.9 87 23 142/65 (90) 94   














I/O      


 


 2/14/18 2/14/18 2/14/18 2/15/18 2/15/18 2/15/18





 07:00 15:00 23:00 07:00 15:00 23:00


 


Intake Total 340 ml 300 ml 720 ml 300 ml  360 ml


 


Output Total 300 ml 1100 ml 1000 ml   


 


Balance 40 ml -800 ml -280 ml 300 ml  360 ml


 


      


 


Intake Oral 240 ml  720 ml   360 ml


 


IV Total 100 ml 300 ml  300 ml  


 


Output Urine Total 300 ml 1100 ml 1000 ml   


 


# Voids 1     3


 


# Bowel Movements 0     0








Result Diagram:  


2/15/18 0435                                                                   

             2/14/18 0609





Objective Remarks


GENERAL: Elderly WF, NAD


SKIN: Warm and dry.


HEAD: Normocephalic.


EYES: No scleral icterus. No injection or drainage. 


NECK: Supple, trachea midline. No JVD or lymphadenopathy.


CARDIOVASCULAR: Regular rate and rhythm without murmurs, gallops, or rubs. 


RESPIRATORY: Breath sounds equal bilaterally. No accessory muscle use.


GASTROINTESTINAL: Abdomen soft, non-tender, nondistended. 


MUSCULOSKELETAL: No cyanosis, or edema. 


BACK: Nontender without obvious deformity. No CVA tenderness.








A/P


Assessment and Plan


Pneumonia


Leucocytosis


NSTMI


DM


Hypoxia


Dementia





PLAN:





Cont Abx


Aerosol nebs


supplement 02 to keep sat >90%


DW RT and RN at BS


Monitor Jareth Arnold MD Feb 15, 2018 20:50

## 2018-02-16 VITALS
RESPIRATION RATE: 20 BRPM | HEART RATE: 88 BPM | SYSTOLIC BLOOD PRESSURE: 170 MMHG | OXYGEN SATURATION: 92 % | TEMPERATURE: 98.1 F | DIASTOLIC BLOOD PRESSURE: 67 MMHG

## 2018-02-16 VITALS
DIASTOLIC BLOOD PRESSURE: 68 MMHG | SYSTOLIC BLOOD PRESSURE: 148 MMHG | OXYGEN SATURATION: 93 % | TEMPERATURE: 97.3 F | RESPIRATION RATE: 18 BRPM | HEART RATE: 76 BPM

## 2018-02-16 VITALS
SYSTOLIC BLOOD PRESSURE: 144 MMHG | DIASTOLIC BLOOD PRESSURE: 63 MMHG | TEMPERATURE: 98.4 F | OXYGEN SATURATION: 96 % | RESPIRATION RATE: 18 BRPM | HEART RATE: 66 BPM

## 2018-02-16 VITALS
HEART RATE: 83 BPM | RESPIRATION RATE: 20 BRPM | DIASTOLIC BLOOD PRESSURE: 66 MMHG | OXYGEN SATURATION: 89 % | TEMPERATURE: 98.4 F | SYSTOLIC BLOOD PRESSURE: 149 MMHG

## 2018-02-16 VITALS — OXYGEN SATURATION: 91 %

## 2018-02-16 LAB
CREAT SERPL-MCNC: 0.49 MG/DL (ref 0.5–1)
GFR SERPLBLD BASED ON 1.73 SQ M-ARVRAT: 121 ML/MIN (ref 89–?)

## 2018-02-16 RX ADMIN — ACETYLCYSTEINE SCH ML: 200 SOLUTION ORAL; RESPIRATORY (INHALATION) at 11:45

## 2018-02-16 RX ADMIN — INSULIN ASPART SCH: 100 INJECTION, SOLUTION INTRAVENOUS; SUBCUTANEOUS at 08:09

## 2018-02-16 RX ADMIN — METHYLPREDNISOLONE SODIUM SUCCINATE SCH MG: 40 INJECTION, POWDER, FOR SOLUTION INTRAMUSCULAR; INTRAVENOUS at 08:08

## 2018-02-16 RX ADMIN — Medication SCH ML: at 07:40

## 2018-02-16 RX ADMIN — ALBUTEROL SULFATE PRN MG: 2.5 SOLUTION RESPIRATORY (INHALATION) at 11:45

## 2018-02-16 RX ADMIN — GUAIFENESIN SCH MG: 600 TABLET, EXTENDED RELEASE ORAL at 08:08

## 2018-02-16 RX ADMIN — ALBUTEROL SULFATE PRN MG: 2.5 SOLUTION RESPIRATORY (INHALATION) at 03:36

## 2018-02-16 RX ADMIN — VANCOMYCIN HYDROCHLORIDE SCH MLS/HR: 1 INJECTION, SOLUTION INTRAVENOUS at 13:02

## 2018-02-16 RX ADMIN — GABAPENTIN SCH MG: 300 CAPSULE ORAL at 08:08

## 2018-02-16 RX ADMIN — PANTOPRAZOLE SODIUM SCH MG: 20 TABLET, DELAYED RELEASE ORAL at 08:08

## 2018-02-16 RX ADMIN — ACETYLCYSTEINE SCH ML: 200 SOLUTION ORAL; RESPIRATORY (INHALATION) at 15:58

## 2018-02-16 RX ADMIN — CEFEPIME SCH MLS/HR: 2 INJECTION, POWDER, FOR SOLUTION INTRAVENOUS at 08:07

## 2018-02-16 RX ADMIN — ACETYLCYSTEINE SCH ML: 200 SOLUTION ORAL; RESPIRATORY (INHALATION) at 03:36

## 2018-02-16 RX ADMIN — SERTRALINE HYDROCHLORIDE SCH MG: 50 TABLET, FILM COATED ORAL at 08:08

## 2018-02-16 RX ADMIN — ACETYLCYSTEINE SCH ML: 200 SOLUTION ORAL; RESPIRATORY (INHALATION) at 07:56

## 2018-02-16 RX ADMIN — INSULIN ASPART SCH: 100 INJECTION, SOLUTION INTRAVENOUS; SUBCUTANEOUS at 12:00

## 2018-02-16 NOTE — HHI.DS
__________________________________________________





Discharge Summary


Admission Date


Feb 9, 2018 at 21:43


Discharge Date:  Feb 16, 2018


Admitting Diagnosis





CP   NSTEMI





(1) Sepsis


ICD Code:  A41.9 - Sepsis, unspecified organism


(2) PNA (pneumonia)


ICD Code:  J18.9 - Pneumonia, unspecified organism


(3) NSTEMI (non-ST elevated myocardial infarction)


ICD Code:  I21.4 - Non-ST elevation (NSTEMI) myocardial infarction


(4) DM (diabetes mellitus)


ICD Code:  E11.9 - Type 2 diabetes mellitus without complications


Procedures


None


Brief History - From Admission


This is an 83-year-old female with a PMH of HTN, DM and Dementia who was sent 

to the ER from SNF secondary to generalized weakness, fever and confusion.  Per 

patient, she's had progressive weakness/fatigue for 4-5 days w/ associated non-

productive cough.  Unsure if she's had fever, however SNF records indicate 

fever of 102.0.  No c/o chest pain or SOB.  On arrival, /62, HR 93, O2 

sat 94% on RA.  WBC 23.6.  Na 129.  468.  Troponin 0.25.  INR 1.2.  CXR patchy 

diffuse infiltrate of right mid to right lower lung suggestive of pneumonia.  S/

p Vanc/Zosyn and started on Heparin gtt in ER.


CBC/BMP:  


2/15/18 0435                                                                   

             2/16/18 0728





Significant Findings





Laboratory Tests








Test


  2/14/18


06:09 2/14/18


08:00 2/15/18


04:35 2/16/18


07:28


 


White Blood Count


  11.5 TH/MM3


(4.0-11.0) 


  13.6 TH/MM3


(4.0-11.0) 


 


 


Red Blood Count


  3.69 MIL/MM3


(4.00-5.30) 


  3.73 MIL/MM3


(4.00-5.30) 


 


 


Hematocrit


  34.1 %


(35.0-46.0) 


  34.2 %


(35.0-46.0) 


 


 


Neutrophils (%) (Auto)


  84.3 %


(16.0-70.0) 


  


  


 


 


Neutrophils # (Auto)


  9.7 TH/MM3


(1.8-7.7) 


  


  


 


 


Random Glucose


  159 MG/DL


() 


  


  


 


 


Calcium Level


  8.3 MG/DL


(8.5-10.1) 


  


  


 


 


Potassium Level


  3.3 MEQ/L


(3.5-5.1) 


  


  


 


 


Vancomycin Level Trough


  


  11.4 MCG/ML


(5.0-10.0) 


  


 


 


Creatinine


  


  


  


  0.49 MG/DL


(0.50-1.00)








Imaging





Last Impressions








Chest X-Ray 2/13/18 0600 Signed





Impressions: 





 Service Date/Time:  Tuesday, February 13, 2018 04:17 - CONCLUSION:  Worsening 





 appearance of the chest.     Serafin Tom MD 








PE at Discharge


GENERAL: NAD


SKIN: Warm and dry.


HEAD: Normocephalic.


EYES: No scleral icterus. No injection or drainage. 


NECK: Supple, trachea midline. No JVD or lymphadenopathy.


CARDIOVASCULAR: Regular rate and rhythm without murmurs, gallops, or rubs. 


RESPIRATORY: Breath sounds decrease bilaterally. No accessory muscle use.


GASTROINTESTINAL: Abdomen soft, non-tender, nondistended. 


MUSCULOSKELETAL: No cyanosis, or edema. 


BACK: Nontender without obvious deformity. No CVA tenderness.





Hospital Course


Prior to discharge to hospice, patient was treated for:





Community-acquired pneumonia


acute hypercapnic respiratory failure


COPD


Treated with oxygen as needed, vancomycin, Cefepime, IV Solu Medrol, breathing 

treatments as needed


pulmonary consult appreciated


Appreciate input from Hospice





Possible NSTEMI


possible fluid overload


Potential elevation in troponin related to pneumonia


Cardiology ff


conservative treatment and no further work-up per cardiology.





Diabetes mellitus type 2


Treated with Levemir,Insulin sliding scale


Pt Condition on Discharge:  Deteriorating


Discharge Disposition:  Hospice/Med Facility


Discharge Time:  > 30 minutes


Discharge Instructions


DIET: Follow Instructions for:  Heart Healthy Diet


Speech Therapy-Diet Recommends:  Regular


Activities you can perform:  Regular-No Restrictions











Hiro Russell MD Feb 16, 2018 12:06

## 2018-02-16 NOTE — HHI.PR
Subjective


Remarks


Follow-up non-ST elevation MI/respiratory failure


02/15/18-patient and examined, still on NRB with significant shortness of breath


02/16/18-patient seen and examined, Hospice evaluated patient this AM, some 

improvement of SOB





Objective


Vitals





Vital Signs








  Date Time  Temp Pulse Resp B/P (MAP) Pulse Ox O2 Delivery O2 Flow Rate FiO2


 


2/16/18 08:00 98.4 83 20 149/66 (93) 89   


 


2/16/18 07:56     91 Nasal Cannula 5.00 


 


2/16/18 04:00  66      


 


2/16/18 04:00 97.3 76 18 148/68 (94) 93   


 


2/16/18 04:00      Nasal Cannula 5.00 





      Humidified  


 


2/16/18 00:00 98.4 66 18 144/63 (90) 96   


 


2/16/18 00:00      Nasal Cannula 5.00 





      Humidified  


 


2/16/18 00:00  68      


 


2/15/18 20:09      Nasal Cannula 5.00 


 


2/15/18 20:00     94 Nasal Cannula 5.00 





      Humidified  


 


2/15/18 20:00  87      


 


2/15/18 20:00 97.8 75 18 163/77 (105) 93   


 


2/15/18 17:00  91      


 


2/15/18 16:25 98.5 81 18 147/67 (93) 96   


 


2/15/18 15:17     98 Non-Rebreather 15.00 


 


2/15/18 12:12 97.9 70 18 151/67 (95) 100   














I/O      


 


 2/15/18 2/15/18 2/15/18 2/16/18 2/16/18 2/16/18





 07:00 15:00 23:00 07:00 15:00 23:00


 


Intake Total 300 ml  660 ml   


 


Balance 300 ml  660 ml   


 


      


 


Intake Oral   360 ml   


 


IV Total 300 ml  300 ml   


 


# Voids   3 3  


 


# Bowel Movements   0   








Result Diagram:  


2/15/18 0435                                                                   

             2/16/18 0728





Objective Remarks


GENERAL: NAD


SKIN: Warm and dry.


HEAD: Normocephalic.


EYES: No scleral icterus. No injection or drainage. 


NECK: Supple, trachea midline. No JVD or lymphadenopathy.


CARDIOVASCULAR: Regular rate and rhythm without murmurs, gallops, or rubs. 


RESPIRATORY: Breath sounds decrease bilaterally. No accessory muscle use.


GASTROINTESTINAL: Abdomen soft, non-tender, nondistended. 


MUSCULOSKELETAL: No cyanosis, or edema. 


BACK: Nontender without obvious deformity. No CVA tenderness.








A/P


Problem List:  


(1) Sepsis


ICD Code:  A41.9 - Sepsis, unspecified organism


(2) PNA (pneumonia)


ICD Code:  J18.9 - Pneumonia, unspecified organism


(3) NSTEMI (non-ST elevated myocardial infarction)


ICD Code:  I21.4 - Non-ST elevation (NSTEMI) myocardial infarction


(4) DM (diabetes mellitus)


ICD Code:  E11.9 - Type 2 diabetes mellitus without complications


Assessment and Plan


83-year-old female with





Community-acquired pneumonia


acute hypercapnic respiratory failure


COPD


Continue oxygen as needed


Continue vancomycin, Cefepime


continue IV Solu Medrol; continue to taper down.


Continue breathing treatments as needed


pulmonary consult appreciated


Appreciate input from Hospice





Possible NSTEMI


possible fluid overload


Potential elevation in troponin related to pneumonia


Cardiology ff


conservative treatment for now and no further work-up per cardiology.





leukocytosis-


continue antibiotics





Diabetes mellitus type 2


Follow blood sugars


continue Levemir


Insulin sliding scale


Diabetic diet


Discharge Planning


D/c to Hospice











Hiro Russell MD Feb 16, 2018 12:04